# Patient Record
Sex: FEMALE | Race: WHITE | NOT HISPANIC OR LATINO | Employment: PART TIME | URBAN - METROPOLITAN AREA
[De-identification: names, ages, dates, MRNs, and addresses within clinical notes are randomized per-mention and may not be internally consistent; named-entity substitution may affect disease eponyms.]

---

## 2017-03-29 ENCOUNTER — OFFICE VISIT (OUTPATIENT)
Dept: URGENT CARE | Facility: CLINIC | Age: 37
End: 2017-03-29
Payer: COMMERCIAL

## 2017-03-29 VITALS
BODY MASS INDEX: 24.8 KG/M2 | OXYGEN SATURATION: 96 % | TEMPERATURE: 99.1 F | HEIGHT: 63 IN | DIASTOLIC BLOOD PRESSURE: 72 MMHG | SYSTOLIC BLOOD PRESSURE: 104 MMHG | WEIGHT: 140 LBS | HEART RATE: 85 BPM

## 2017-03-29 DIAGNOSIS — J01.90 ACUTE BACTERIAL SINUSITIS: ICD-10-CM

## 2017-03-29 DIAGNOSIS — J02.9 SORE THROAT: ICD-10-CM

## 2017-03-29 DIAGNOSIS — B96.89 ACUTE BACTERIAL SINUSITIS: ICD-10-CM

## 2017-03-29 LAB
INT CON NEG: NEGATIVE
INT CON POS: POSITIVE
S PYO AG THROAT QL: NORMAL

## 2017-03-29 PROCEDURE — 87880 STREP A ASSAY W/OPTIC: CPT | Performed by: NURSE PRACTITIONER

## 2017-03-29 PROCEDURE — 99214 OFFICE O/P EST MOD 30 MIN: CPT | Performed by: NURSE PRACTITIONER

## 2017-03-29 ASSESSMENT — ENCOUNTER SYMPTOMS
NECK PAIN: 1
FEVER: 0
SORE THROAT: 1
CHILLS: 0
VOMITING: 0
COUGH: 1
HEADACHES: 1
DIZZINESS: 0
NAUSEA: 0
MYALGIAS: 1

## 2017-03-29 NOTE — MR AVS SNAPSHOT
"        Ozzy GUIDO Addison   3/29/2017 6:00 PM   Office Visit   MRN: [id number]    Department:  [facility]   Dept Phone:  [phone]    Description:  Female : 1980   Provider:  BOB Bailey           Reason for Visit     Sinus Problem X 8 days, Sinus pressure, headache, ST, Dry cough, SOB, wheezing       Allergies as of 3/29/2017     Allergen Noted Reactions    Ceclor [Cefaclor] 2011       Ceftin 2011       Penicillins 2013         You were diagnosed with     Sore throat   [468331]       Acute bacterial sinusitis   [421546]         Vital Signs     Blood Pressure Pulse Temperature Height Weight Body Mass Index    104/72 mmHg 85 37.3 °C (99.1 °F) 1.6 m (5' 2.99\") 63.504 kg (140 lb) 24.81 kg/m2    Oxygen Saturation                   96%           Basic Information     Date Of Birth Sex Race Ethnicity Preferred Language    1980 Female White Non- English      Health Maintenance        Date Due Completion Dates    IMM DTaP/Tdap/Td Vaccine (1 - Tdap) 3/25/2001 ---    PAP SMEAR 3/25/2003 ---    IMM INFLUENZA (1) 2016 ---            Current Immunizations     No immunizations on file.      Below and/or attached are the medications your provider expects you to take. Review all of your home medications and newly ordered medications with your provider and/or pharmacist. Follow medication instructions as directed by your provider and/or pharmacist. Please keep your medication list with you and share with your provider. Update the information when medications are discontinued, doses are changed, or new medications (including over-the-counter products) are added; and carry medication information at all times in the event of emergency situations     Allergies:  CECLOR - (reactions not documented)     CEFTIN - (reactions not documented)     PENICILLINS - (reactions not documented)               Medications  Valid as of: 2017 -  6:21 PM    Generic Name Brand Name " Tablet Size Instructions for use    Azithromycin (Tab) ZITHROMAX 250 MG 2 tabs by mouth day 1, 1 tab by mouth days 2-5        Fluconazole (Tab) DIFLUCAN 100 MG Take 1 Tab by mouth every day.        Fluconazole (Tab) DIFLUCAN 150 MG Take 1 Tab by mouth every day.        Fluconazole (Tab) DIFLUCAN 150 MG Take 1 Tab by mouth every day.        FLUoxetine HCl   Take  by mouth.        Non Formulary Request Non Formulary Request  Pt. Is taking birth control        Non Formulary Request Non Formulary Request  Pt. Is also taking flextin for depresion         Norgestim-Eth Estrad Triphasic   Take  by mouth.        Phenazopyridine HCl (Tab) PYRIDIUM 200 MG Take 1 Tab by mouth 3 times a day as needed for Mild Pain.        Phenazopyridine HCl (Tab) PYRIDIUM 200 MG Take 1 Tab by mouth 3 times a day as needed for Mild Pain.        Polymyxin B-Trimethoprim (Solution) POLYTRIM 46109-8.1 UNIT/ML-% Place 1-2 Drops in both eyes every 6 hours.        .                 Medicines prescribed today were sent to:     [id number] - [place], [place] - [address]    [address] [facility] [address]    Phone: [phone] Fax: [phone]    Open 24 Hours?: No      Medication refill instructions:       If your prescription bottle indicates you have medication refills left, it is not necessary to call your provider’s office. Please contact your pharmacy and they will refill your medication.    If your prescription bottle indicates you do not have any refills left, you may request refills at any time through one of the following ways: The online Chic by Choice system (except Urgent Care), by calling your provider’s office, or by asking your pharmacy to contact your provider’s office with a refill request. Medication refills are processed only during regular business hours and may not be available until the next business day. Your provider may request additional information or to have a follow-up visit with you prior to refilling your medication.      *Please Note: Medication refills are assigned a new Rx number when refilled electronically. Your pharmacy may indicate that no refills were authorized even though a new prescription for the same medication is available at the pharmacy. Please request the medicine by name with the pharmacy before contacting your provider for a refill.           Precyse Access Code: [email or web address]  Expires: 4/28/2017  6:21 PM    Precyse  A secure, online tool to manage your health information     [employer]’s Precyse® is a secure, online tool that connects you to your personalized health information from the privacy of your home -- day or night - making it very easy for you to manage your healthcare. Once the activation process is completed, you can even access your medical information using the Precyse james, which is available for free in the Apple James store or Google Play store.     Precyse provides the following levels of access (as shown below):   My Chart Features   [facility] Primary Care Doctor [facility]  Specialists [facility]  Urgent  Care Non-[facility]  Primary Care  Doctor   Email your healthcare team securely and privately 24/7 X X X    Manage appointments: schedule your next appointment; view details of past/upcoming appointments X      Request prescription refills. X      View recent personal medical records, including lab and immunizations X X X X   View health record, including health history, allergies, medications X X X X   Read reports about your outpatient visits, procedures, consult and ER notes X X X X   See your discharge summary, which is a recap of your hospital and/or ER visit that includes your diagnosis, lab results, and care plan. X X       How to register for Precyse:  1. Go to  [email or web address].  2. Click on the Sign Up Now box, which takes you to the New Member Sign Up page. You will need to provide the following information:  a. Enter your Precyse Access Code exactly as it appears at the top of this  page. (You will not need to use this code after you’ve completed the sign-up process. If you do not sign up before the expiration date, you must request a new code.)   b. Enter your date of birth.   c. Enter your home email address.   d. Click Submit, and follow the next screen’s instructions.  3. Create a Radiation Watch ID. This will be your Radiation Watch login ID and cannot be changed, so think of one that is secure and easy to remember.  4. Create a Radiation Watch password. You can change your password at any time.  5. Enter your Password Reset Question and Answer. This can be used at a later time if you forget your password.   6. Enter your e-mail address. This allows you to receive e-mail notifications when new information is available in Radiation Watch.  7. Click Sign Up. You can now view your health information.    For assistance activating your Radiation Watch account, call [phone]

## 2017-03-30 RX ORDER — AMOXICILLIN AND CLAVULANATE POTASSIUM 875; 125 MG/1; MG/1
1 TABLET, FILM COATED ORAL 2 TIMES DAILY
Qty: 20 TAB | Refills: 0 | Status: SHIPPED | OUTPATIENT
Start: 2017-03-30 | End: 2017-04-06

## 2017-03-30 RX ORDER — FLUCONAZOLE 150 MG/1
150 TABLET ORAL DAILY
Qty: 1 TAB | Refills: 1 | Status: SHIPPED | OUTPATIENT
Start: 2017-03-30 | End: 2018-03-07

## 2017-03-30 NOTE — PROGRESS NOTES
"Subjective:      Ozzy Jaime is a 37 y.o. female who presents with Sinus Problem            Sinus Problem  This is a new problem. The current episode started 1 to 4 weeks ago. The problem is unchanged. There has been no fever. Associated symptoms include congestion, coughing, headaches, neck pain and a sore throat. Pertinent negatives include no chills. (Ear fullness  ) Treatments tried: allegra D.   Allergies, medications and history reviewed by me today      Review of Systems   Constitutional: Negative for fever and chills.   HENT: Positive for congestion and sore throat.    Respiratory: Positive for cough.    Gastrointestinal: Negative for nausea and vomiting.   Musculoskeletal: Positive for myalgias and neck pain.   Skin: Negative for rash.   Neurological: Positive for headaches. Negative for dizziness.          Objective:     /72 mmHg  Pulse 85  Temp(Src) 37.3 °C (99.1 °F)  Ht 1.6 m (5' 2.99\")  Wt 63.504 kg (140 lb)  BMI 24.81 kg/m2  SpO2 96%     Physical Exam   Constitutional: She is oriented to person, place, and time. She appears well-developed and well-nourished. No distress.   HENT:   Head: Normocephalic and atraumatic.   Right Ear: External ear and ear canal normal. Tympanic membrane is not injected and not perforated. No middle ear effusion.   Left Ear: External ear and ear canal normal. Tympanic membrane is not injected and not perforated.  No middle ear effusion.   Nose: Mucosal edema present.   Mouth/Throat: Posterior oropharyngeal erythema present. No oropharyngeal exudate.   Eyes: Conjunctivae are normal. Right eye exhibits no discharge. Left eye exhibits no discharge.   Neck: Normal range of motion. Neck supple.   Cardiovascular: Normal rate, regular rhythm and normal heart sounds.    No murmur heard.  Pulmonary/Chest: Effort normal and breath sounds normal. No respiratory distress.   Musculoskeletal: Normal range of motion.   Normal movement of all 4 extremities.   Lymphadenopathy: "     She has no cervical adenopathy.        Right: No supraclavicular adenopathy present.        Left: No supraclavicular adenopathy present.   Neurological: She is alert and oriented to person, place, and time. Gait normal.   Skin: Skin is warm and dry.   Psychiatric: She has a normal mood and affect. Her behavior is normal. Thought content normal.   Nursing note and vitals reviewed.              Assessment/Plan:     1. Sore throat  POCT Rapid Strep A   2. Acute bacterial sinusitis       Strep negative.   8 days with worsening symptoms over last 2 days. She has some allergies as well.  She has tolerated augmentin in past per her report.  Differential diagnosis, natural history, supportive care, and indications for immediate follow-up discussed at length.

## 2017-04-17 ENCOUNTER — OFFICE VISIT (OUTPATIENT)
Dept: URGENT CARE | Facility: CLINIC | Age: 37
End: 2017-04-17
Payer: COMMERCIAL

## 2017-04-17 VITALS
WEIGHT: 145.4 LBS | HEIGHT: 63 IN | TEMPERATURE: 97.9 F | HEART RATE: 78 BPM | OXYGEN SATURATION: 97 % | DIASTOLIC BLOOD PRESSURE: 76 MMHG | SYSTOLIC BLOOD PRESSURE: 116 MMHG | RESPIRATION RATE: 18 BRPM | BODY MASS INDEX: 25.76 KG/M2

## 2017-04-17 DIAGNOSIS — R06.2 WHEEZE: ICD-10-CM

## 2017-04-17 DIAGNOSIS — J98.8 RTI (RESPIRATORY TRACT INFECTION): ICD-10-CM

## 2017-04-17 PROCEDURE — 99214 OFFICE O/P EST MOD 30 MIN: CPT | Performed by: PHYSICIAN ASSISTANT

## 2017-04-17 RX ORDER — DOXYCYCLINE HYCLATE 100 MG
100 TABLET ORAL 2 TIMES DAILY
Qty: 20 TAB | Refills: 0 | Status: SHIPPED | OUTPATIENT
Start: 2017-04-17 | End: 2018-03-07

## 2017-04-17 RX ORDER — ALBUTEROL SULFATE 90 UG/1
2 AEROSOL, METERED RESPIRATORY (INHALATION) EVERY 6 HOURS PRN
Qty: 8.5 G | Refills: 1 | Status: SHIPPED | OUTPATIENT
Start: 2017-04-17 | End: 2018-03-07

## 2017-04-17 RX ORDER — PROMETHAZINE HYDROCHLORIDE AND CODEINE PHOSPHATE 6.25; 1 MG/5ML; MG/5ML
5 SYRUP ORAL 4 TIMES DAILY PRN
Qty: 120 ML | Refills: 0 | Status: SHIPPED | OUTPATIENT
Start: 2017-04-17 | End: 2018-03-07

## 2017-04-17 ASSESSMENT — ENCOUNTER SYMPTOMS
NAUSEA: 0
FEVER: 0
DIZZINESS: 0
SHORTNESS OF BREATH: 1
ABDOMINAL PAIN: 0
CHILLS: 0
WHEEZING: 1
SORE THROAT: 1
SPUTUM PRODUCTION: 1
HEADACHES: 1
DIARRHEA: 0
RHINORRHEA: 1
COUGH: 1
PALPITATIONS: 0
VOMITING: 0
MYALGIAS: 0

## 2017-04-17 ASSESSMENT — COPD QUESTIONNAIRES: COPD: 0

## 2017-04-17 NOTE — PROGRESS NOTES
Subjective:      Ozzy Do is a 37 y.o. female who presents with Cough            Cough  This is a new problem. The current episode started 1 to 4 weeks ago. The problem has been gradually worsening. The problem occurs every few minutes. The cough is productive of sputum. Associated symptoms include ear congestion, headaches, nasal congestion, rhinorrhea, a sore throat, shortness of breath and wheezing. Pertinent negatives include no chest pain, chills, ear pain, fever or myalgias. She has tried OTC cough suppressant for the symptoms. The treatment provided mild relief. Her past medical history is significant for asthma. There is no history of bronchitis, COPD or pneumonia.   Was seen 2.5 weeks ago and diagnosed with sinus infection. She was put on 10 days of Augmentin. She reports significant improvement however when she finished the antibiotic her symptoms returned and worse. Now she is having severe cough and some wheezing.      PMH:  has no past medical history on file.  MEDS:   Current outpatient prescriptions:   •  fluconazole (DIFLUCAN) 150 MG tablet, Take 1 Tab by mouth every day., Disp: 1 Tab, Rfl: 1  •  fluconazole (DIFLUCAN) 150 MG tablet, Take 1 Tab by mouth every day., Disp: 1 Tab, Rfl: 1  •  polymixin-trimethoprim (POLYTRIM) 27747-3.1 UNIT/ML-% SOLN, Place 1-2 Drops in both eyes every 6 hours., Disp: 1 Bottle, Rfl: 0  •  FLUoxetine HCl (PROZAC PO), Take  by mouth., Disp: , Rfl:   •  azithromycin (ZITHROMAX) 250 MG TABS, 2 tabs by mouth day 1, 1 tab by mouth days 2-5, Disp: 6 Tab, Rfl: 0  •  fluconazole (DIFLUCAN) 150 MG tablet, Take 1 Tab by mouth every day., Disp: 1 Tab, Rfl: 0  •  Norgestim-Eth Estrad Triphasic (ORTHO TRI-CYCLEN LO PO), Take  by mouth., Disp: , Rfl:   •  phenazopyridine (PYRIDIUM) 200 MG TABS, Take 1 Tab by mouth 3 times a day as needed for Mild Pain., Disp: 20 Each, Rfl: 0  •  Non Formulary Request, Pt. Is taking birth control, Disp: , Rfl:   •  Non Formulary Request, Pt. Is  "also taking flextin for depresion , Disp: , Rfl:   •  phenazopyridine (PYRIDIUM) 200 MG TABS, Take 1 Tab by mouth 3 times a day as needed for Mild Pain., Disp: 12 Each, Rfl: 0  •  fluconazole (DIFLUCAN) 100 MG TABS, Take 1 Tab by mouth every day., Disp: 1 Each, Rfl: 0  ALLERGIES:   Allergies   Allergen Reactions   • Ceclor [Cefaclor]    • Ceftin    • Penicillins      SURGHX: No past surgical history on file.  SOCHX:  reports that she has never smoked. She does not have any smokeless tobacco history on file. She reports that she drinks alcohol. She reports that she does not use illicit drugs.  FH: family history is not on file.      Review of Systems   Constitutional: Positive for malaise/fatigue. Negative for fever and chills.   HENT: Positive for congestion, rhinorrhea and sore throat. Negative for ear pain.    Respiratory: Positive for cough, sputum production, shortness of breath and wheezing.    Cardiovascular: Negative for chest pain, palpitations and leg swelling.   Gastrointestinal: Negative for nausea, vomiting, abdominal pain and diarrhea.   Musculoskeletal: Negative for myalgias and joint pain.   Neurological: Positive for headaches. Negative for dizziness.       Medications, Allergies, and current problem list reviewed today in Epic     Objective:     /76 mmHg  Pulse 78  Temp(Src) 36.6 °C (97.9 °F)  Resp 18  Ht 1.6 m (5' 3\")  Wt 65.953 kg (145 lb 6.4 oz)  BMI 25.76 kg/m2  SpO2 97%     Physical Exam   Constitutional: She is oriented to person, place, and time. She appears well-developed and well-nourished. No distress.   HENT:   Head: Normocephalic and atraumatic.   Right Ear: Hearing, tympanic membrane, external ear and ear canal normal. Tympanic membrane is not erythematous. No decreased hearing is noted.   Left Ear: Hearing, tympanic membrane, external ear and ear canal normal. Tympanic membrane is not erythematous. No decreased hearing is noted.   Nose: Right sinus exhibits maxillary sinus " tenderness. Left sinus exhibits no maxillary sinus tenderness.   Mouth/Throat: Normal dentition. Posterior oropharyngeal erythema present. No oropharyngeal exudate.   Eyes: Conjunctivae and EOM are normal. Pupils are equal, round, and reactive to light. Right eye exhibits no discharge. Left eye exhibits no discharge. No scleral icterus.   Neck: Normal range of motion. Neck supple.   Cardiovascular: Normal rate, regular rhythm and normal heart sounds.    No murmur heard.  Pulmonary/Chest: Effort normal. No respiratory distress. She has no decreased breath sounds. She has wheezes (scattered). She has no rhonchi.   Lymphadenopathy:        Head (right side): Submandibular adenopathy present.        Head (left side): Submandibular adenopathy present.     She has no cervical adenopathy.        Right cervical: No superficial cervical adenopathy present.       Left cervical: No superficial cervical adenopathy present.   Neurological: She is alert and oriented to person, place, and time.   Skin: Skin is warm, dry and intact. She is not diaphoretic. No cyanosis. Nails show no clubbing.   Psychiatric: She has a normal mood and affect. Her behavior is normal. Judgment and thought content normal.   Nursing note and vitals reviewed.              Assessment/Plan:      1. RTI (respiratory tract infection)  doxycycline (VIBRAMYCIN) 100 MG Tab    albuterol 108 (90 BASE) MCG/ACT Aero Soln inhalation aerosol    promethazine-codeine (PHENERGAN-CODEINE) 6.25-10 MG/5ML Syrup   2. Wheeze  albuterol 108 (90 BASE) MCG/ACT Aero Soln inhalation aerosol    promethazine-codeine (PHENERGAN-CODEINE) 6.25-10 MG/5ML Syrup     PO2 97%. Mild wheezing otherwise no signs of pneumonia.  Take full course of antibiotic  Cough meds at night  [facility] Aware web site evaluation: I have obtained and reviewed patient utilization report from [facility] pharmacy database prior to writing prescription for controlled substance II, III or IV. Based on the report  and my clinical assessment the prescription is medically necessary.   Patient is cautioned on sedation potential of narcotic medication; no drinking, driving or operating heavy machinery while on this medication.  Albuterol for wheezing  OTC meds and conservative measures as discussed  If no improvement on this round of antibiotics patient will need a referral to ENT.  Return to clinic or go to ED if symptoms worsen or persist. Indications for ED discussed at length. Patient voices understanding. Follow-up with your primary care provider in 3-5 days. Red flags discussed.    Please note that this dictation was created using voice recognition software. I have made every reasonable attempt to correct obvious errors, but I expect that there are errors of grammar and possibly content that I did not discover before finalizing the note.

## 2017-04-17 NOTE — MR AVS SNAPSHOT
"        Ozzy Do   2017 11:30 AM   Office Visit   MRN: [id number]    Department:  [facility]   Dept Phone:  [phone]    Description:  Female : 1980   Provider:  Rao Flaherty PA-C           Reason for Visit     Cough x was seen , finished 10 day course of amoxicillin, getting worse, loss of voice, cough, nasal congestion and drainage,       Allergies as of 2017     Allergen Noted Reactions    Ceclor [Cefaclor] 2011       Ceftin 2011       Penicillins 2013         You were diagnosed with     RTI (respiratory tract infection)   [301440]       Wheeze   [[id number]]         Vital Signs     Blood Pressure Pulse Temperature Respirations Height Weight    116/76 mmHg 78 36.6 °C (97.9 °F) 18 1.6 m (5' 3\") 65.953 kg (145 lb 6.4 oz)    Body Mass Index Oxygen Saturation                25.76 kg/m2 97%          Basic Information     Date Of Birth Sex Race Ethnicity Preferred Language    1980 Female White Non- English      Health Maintenance        Date Due Completion Dates    IMM DTaP/Tdap/Td Vaccine (1 - Tdap) 3/25/2001 ---    PAP SMEAR 3/25/2003 ---            Current Immunizations     No immunizations on file.      Below and/or attached are the medications your provider expects you to take. Review all of your home medications and newly ordered medications with your provider and/or pharmacist. Follow medication instructions as directed by your provider and/or pharmacist. Please keep your medication list with you and share with your provider. Update the information when medications are discontinued, doses are changed, or new medications (including over-the-counter products) are added; and carry medication information at all times in the event of emergency situations     Allergies:  CECLOR - (reactions not documented)     CEFTIN - (reactions not documented)     PENICILLINS - (reactions not documented)               Medications  Valid as of: 2017 - " 11:46 AM    Generic Name Brand Name Tablet Size Instructions for use    Albuterol Sulfate (Aero Soln) albuterol 108 (90 BASE) MCG/ACT Inhale 2 Puffs by mouth every 6 hours as needed for Shortness of Breath.        Azithromycin (Tab) ZITHROMAX 250 MG 2 tabs by mouth day 1, 1 tab by mouth days 2-5        Doxycycline Hyclate (Tab) VIBRAMYCIN 100 MG Take 1 Tab by mouth 2 times a day.        Fluconazole (Tab) DIFLUCAN 100 MG Take 1 Tab by mouth every day.        Fluconazole (Tab) DIFLUCAN 150 MG Take 1 Tab by mouth every day.        Fluconazole (Tab) DIFLUCAN 150 MG Take 1 Tab by mouth every day.        Fluconazole (Tab) DIFLUCAN 150 MG Take 1 Tab by mouth every day.        FLUoxetine HCl   Take  by mouth.        Non Formulary Request Non Formulary Request  Pt. Is taking birth control        Non Formulary Request Non Formulary Request  Pt. Is also taking flextin for depresion         Norgestim-Eth Estrad Triphasic   Take  by mouth.        Phenazopyridine HCl (Tab) PYRIDIUM 200 MG Take 1 Tab by mouth 3 times a day as needed for Mild Pain.        Phenazopyridine HCl (Tab) PYRIDIUM 200 MG Take 1 Tab by mouth 3 times a day as needed for Mild Pain.        Polymyxin B-Trimethoprim (Solution) POLYTRIM 94193-1.1 UNIT/ML-% Place 1-2 Drops in both eyes every 6 hours.        Promethazine-Codeine (Syrup) PHENERGAN-CODEINE 6.25-10 MG/5ML Take 5 mL by mouth 4 times a day as needed for Cough.        .                 Medicines prescribed today were sent to:     [facility] - [place] - [address]    [address] [place] [address]    Phone: [phone] Fax: [phone]    Open 24 Hours?: No      Medication refill instructions:       If your prescription bottle indicates you have medication refills left, it is not necessary to call your provider’s office. Please contact your pharmacy and they will refill your medication.    If your prescription bottle indicates you do not have any refills left, you may request refills at  any time through one of the following ways: The online Punchd system (except Urgent Care), by calling your provider’s office, or by asking your pharmacy to contact your provider’s office with a refill request. Medication refills are processed only during regular business hours and may not be available until the next business day. Your provider may request additional information or to have a follow-up visit with you prior to refilling your medication.   *Please Note: Medication refills are assigned a new Rx number when refilled electronically. Your pharmacy may indicate that no refills were authorized even though a new prescription for the same medication is available at the pharmacy. Please request the medicine by name with the pharmacy before contacting your provider for a refill.           Punchd Access Code: [email or web address]  Expires: 4/28/2017  6:21 PM    Punchd  A secure, online tool to manage your health information     [email or web address]’s Punchd® is a secure, online tool that connects you to your personalized health information from the privacy of your home -- day or night - making it very easy for you to manage your healthcare. Once the activation process is completed, you can even access your medical information using the Punchd james, which is available for free in the Apple James store or Google Play store.     Punchd provides the following levels of access (as shown below):   My Chart Features   [facility] Primary Care Doctor [facility]  Specialists [facility]  Urgent  Care Non-[facility]  Primary Care  Doctor   Email your healthcare team securely and privately 24/7 X X X    Manage appointments: schedule your next appointment; view details of past/upcoming appointments X      Request prescription refills. X      View recent personal medical records, including lab and immunizations X X X X   View health record, including health history, allergies, medications X X X X   Read reports about your outpatient visits,  procedures, consult and ER notes X X X X   See your discharge summary, which is a recap of your hospital and/or ER visit that includes your diagnosis, lab results, and care plan. X X       How to register for [employer]  1. Go to  [email or web address].  2. Click on the Sign Up Now box, which takes you to the New Member Sign Up page. You will need to provide the following information:  a. Enter your [employer] Access Code exactly as it appears at the top of this page. (You will not need to use this code after you’ve completed the sign-up process. If you do not sign up before the expiration date, you must request a new code.)   b. Enter your date of birth.   c. Enter your home email address.   d. Click Submit, and follow the next screen’s instructions.  3. Create a Glustert ID. This will be your Glustert login ID and cannot be changed, so think of one that is secure and easy to remember.  4. Create a Glustert password. You can change your password at any time.  5. Enter your Password Reset Question and Answer. This can be used at a later time if you forget your password.   6. Enter your e-mail address. This allows you to receive e-mail notifications when new information is available in [employer]  7. Click Sign Up. You can now view your health information.    For assistance activating your [employer] account, call [phone]

## 2017-09-27 ENCOUNTER — HOSPITAL ENCOUNTER (OUTPATIENT)
Dept: RADIOLOGY | Facility: MEDICAL CENTER | Age: 37
End: 2017-09-27
Attending: NURSE PRACTITIONER
Payer: COMMERCIAL

## 2017-09-27 DIAGNOSIS — Z84.2: ICD-10-CM

## 2017-09-27 DIAGNOSIS — Z12.31 SCREENING MAMMOGRAM, ENCOUNTER FOR: ICD-10-CM

## 2017-09-27 PROCEDURE — G0202 SCR MAMMO BI INCL CAD: HCPCS

## 2018-03-07 ENCOUNTER — OFFICE VISIT (OUTPATIENT)
Dept: URGENT CARE | Facility: CLINIC | Age: 38
End: 2018-03-07
Payer: COMMERCIAL

## 2018-03-07 VITALS
WEIGHT: 148.15 LBS | OXYGEN SATURATION: 97 % | HEIGHT: 63 IN | TEMPERATURE: 97.8 F | RESPIRATION RATE: 16 BRPM | HEART RATE: 76 BPM | SYSTOLIC BLOOD PRESSURE: 122 MMHG | BODY MASS INDEX: 26.25 KG/M2 | DIASTOLIC BLOOD PRESSURE: 78 MMHG

## 2018-03-07 DIAGNOSIS — H65.194 OTHER RECURRENT ACUTE NONSUPPURATIVE OTITIS MEDIA OF RIGHT EAR: ICD-10-CM

## 2018-03-07 PROCEDURE — 99213 OFFICE O/P EST LOW 20 MIN: CPT | Performed by: NURSE PRACTITIONER

## 2018-03-07 RX ORDER — AMOXICILLIN 875 MG/1
875 TABLET, COATED ORAL 2 TIMES DAILY
Qty: 20 TAB | Refills: 0 | Status: SHIPPED | OUTPATIENT
Start: 2018-03-07 | End: 2018-03-17

## 2018-03-07 RX ORDER — NORETHINDRONE ACETATE/ETHINYL ESTRADIOL 1MG-20MCG
1 KIT ORAL DAILY
COMMUNITY
Start: 2018-02-15

## 2018-03-07 RX ORDER — FLUOXETINE HYDROCHLORIDE 40 MG/1
40 CAPSULE ORAL DAILY
COMMUNITY
Start: 2018-02-26

## 2018-03-07 RX ORDER — FLUCONAZOLE 150 MG/1
150 TABLET ORAL ONCE
Qty: 1 TAB | Refills: 0 | Status: SHIPPED | OUTPATIENT
Start: 2018-03-07 | End: 2018-03-07

## 2018-03-07 ASSESSMENT — ENCOUNTER SYMPTOMS
RESPIRATORY NEGATIVE: 1
CONSTITUTIONAL NEGATIVE: 1
SORE THROAT: 1
NEUROLOGICAL NEGATIVE: 1
MUSCULOSKELETAL NEGATIVE: 1

## 2018-03-07 NOTE — PROGRESS NOTES
"Subjective:      Ozzy Do is a 37 y.o. female who presents with Otalgia (x1day right ear, consistant pain); Pharyngitis; and Neck Pain  Surgical HX reviewed in epic and not pertinent to today's visit  No past medical history on file.  Current medications reviewed.  Social History   Substance Use Topics   • Smoking status: Never Smoker   • Smokeless tobacco: Never Used   • Alcohol use Yes     HPI  Chief Complaint   Patient presents with   • Otalgia     x1day right ear, consistant pain   • Pharyngitis   • Neck Pain   Started about 24 hours ago with right ear pain and ST. No cough. No fever. Aching and throbbing in nature. Pain 6/10. She tried an allergy med without relief. No hx. No other aggravating or alleviating factors.     Review of Systems   Constitutional: Negative.    HENT: Positive for ear pain and sore throat.    Respiratory: Negative.    Musculoskeletal: Negative.    Skin: Negative.    Neurological: Negative.    All other systems reviewed and are negative.         Objective:     /78   Pulse 76   Temp 36.6 °C (97.8 °F)   Resp 16   Ht 1.6 m (5' 3\")   Wt 67.2 kg (148 lb 2.4 oz)   SpO2 97%   BMI 26.24 kg/m²      Physical Exam   Constitutional: She is oriented to person, place, and time. She appears well-developed and well-nourished. No distress.   HENT:   Head: Normocephalic.   Right Ear: Ear canal normal. Tympanic membrane is erythematous (mild).   Left Ear: Tympanic membrane and ear canal normal. Tympanic membrane is not erythematous.   Nose: Nose normal.   Mouth/Throat: Uvula is midline, oropharynx is clear and moist and mucous membranes are normal. No tonsillar exudate.   No TMJ tenderness    Neck: Normal range of motion.   Pulmonary/Chest: Effort normal. No respiratory distress.   Musculoskeletal: Normal range of motion.   Lymphadenopathy:     She has cervical adenopathy.        Right cervical: Superficial cervical adenopathy present.        Left cervical: No superficial cervical " adenopathy present.   Neurological: She is alert and oriented to person, place, and time.   Skin: Skin is warm and dry.   Psychiatric: She has a normal mood and affect.   Nursing note and vitals reviewed.              Assessment/Plan:     1. Other recurrent acute nonsuppurative otitis media of right ear  amoxicillin (AMOXIL) 875 MG tablet    fluconazole (DIFLUCAN) 150 MG tablet     Discussed that she does have a slight effusion and erythema, however I would tx conservatively with motrin, warm compresses and flonase. If sx persist or get worse over the next 48 hours, then start ABX. She verbalized understanding    Please make an appointment for follow up with your primary care provider. Return for any change in condition, worsening of symptoms, or any other concerns. Please go to the nearest emergency room for any shortness of breath or chest pain or for any of the emergent conditions we have discussed. Patient states understanding to plan of care and discharge instructions.    Please note that this dictation was created using voice recognition software. I have worked with consultants from the vendor as well as technical experts from [facility] to optimize the interface. I have made every reasonable attempt to correct obvious errors, but I expect that there are errors of grammar and possibly content that I did not discover before finalizing the note.

## 2018-11-14 ENCOUNTER — OFFICE VISIT (OUTPATIENT)
Dept: URGENT CARE | Facility: CLINIC | Age: 38
End: 2018-11-14
Payer: COMMERCIAL

## 2018-11-14 VITALS
TEMPERATURE: 98.5 F | WEIGHT: 149 LBS | HEIGHT: 63 IN | OXYGEN SATURATION: 95 % | BODY MASS INDEX: 26.4 KG/M2 | RESPIRATION RATE: 14 BRPM | SYSTOLIC BLOOD PRESSURE: 132 MMHG | DIASTOLIC BLOOD PRESSURE: 70 MMHG | HEART RATE: 84 BPM

## 2018-11-14 DIAGNOSIS — J32.9 OTHER SINUSITIS, UNSPECIFIED CHRONICITY: ICD-10-CM

## 2018-11-14 DIAGNOSIS — J00 NASOPHARYNGITIS: ICD-10-CM

## 2018-11-14 PROCEDURE — 99214 OFFICE O/P EST MOD 30 MIN: CPT | Performed by: PHYSICIAN ASSISTANT

## 2018-11-14 RX ORDER — AMOXICILLIN AND CLAVULANATE POTASSIUM 875; 125 MG/1; MG/1
1 TABLET, FILM COATED ORAL 2 TIMES DAILY
Qty: 20 TAB | Refills: 1 | Status: SHIPPED | OUTPATIENT
Start: 2018-11-14 | End: 2018-11-24

## 2018-11-14 RX ORDER — FLUCONAZOLE 150 MG/1
150 TABLET ORAL
Qty: 2 TAB | Refills: 1 | Status: SHIPPED | OUTPATIENT
Start: 2018-11-14 | End: 2019-10-23

## 2018-11-14 ASSESSMENT — ENCOUNTER SYMPTOMS
SORE THROAT: 1
CONSTITUTIONAL NEGATIVE: 1
CARDIOVASCULAR NEGATIVE: 1
RESPIRATORY NEGATIVE: 1
HEADACHES: 1
COUGH: 0
SINUS PRESSURE: 1
SINUS PAIN: 1
EYES NEGATIVE: 1
SWOLLEN GLANDS: 1

## 2018-11-14 NOTE — PROGRESS NOTES
"Subjective:      Ozzy Do is a 38 y.o. female who presents with Sinus Problem (x3mxrgw, sinus congestion and pain, runny nose, sore throat, mucous, ear pressure, glands are painful)            Sinus Problem   This is a new problem. The current episode started more than 1 month ago (sinus mani, st). The problem is unchanged. There has been no fever. The pain is moderate. Associated symptoms include congestion, headaches, sinus pressure, a sore throat and swollen glands. Pertinent negatives include no coughing. Past treatments include nothing. The treatment provided no relief.       Review of Systems   Constitutional: Negative.    HENT: Positive for congestion, sinus pain, sinus pressure and sore throat.    Eyes: Negative.    Respiratory: Negative.  Negative for cough.    Cardiovascular: Negative.    Skin: Negative.    Neurological: Positive for headaches.   All other systems reviewed and are negative.         Objective:     /70 (BP Location: Left arm, Patient Position: Sitting, BP Cuff Size: Adult)   Pulse 84   Temp 36.9 °C (98.5 °F) (Temporal)   Resp 14   Ht 1.6 m (5' 3\")   Wt 67.6 kg (149 lb)   SpO2 95%   BMI 26.39 kg/m²      Physical Exam   Constitutional: She is oriented to person, place, and time. She appears well-developed and well-nourished. No distress.   HENT:   Head: Normocephalic and atraumatic.   Mouth/Throat: No oropharyngeal exudate.   +maxill.sinus press.  +phar./tons redn       Eyes: Pupils are equal, round, and reactive to light. Conjunctivae and EOM are normal.   Neck: Normal range of motion. Neck supple.   Cardiovascular: Normal rate.    Pulmonary/Chest: Effort normal and breath sounds normal. No respiratory distress.   Lymphadenopathy:     She has cervical adenopathy.   Neurological: She is alert and oriented to person, place, and time.   Skin: Skin is warm and dry. No rash noted.   Psychiatric: She has a normal mood and affect. Her behavior is normal.   Nursing note and vitals " reviewed.    Active Ambulatory Problems     Diagnosis Date Noted   • No Active Ambulatory Problems     Resolved Ambulatory Problems     Diagnosis Date Noted   • No Resolved Ambulatory Problems     No Additional Past Medical History     Current Outpatient Prescriptions on File Prior to Visit   Medication Sig Dispense Refill   • fluoxetine (PROZAC) 40 MG capsule      • MAXWELL 1/20 1-20 MG-MCG per tablet        No current facility-administered medications on file prior to visit.      Social History     Social History   • Marital status: Single     Spouse name: N/A   • Number of children: N/A   • Years of education: N/A     Occupational History   • Not on file.     Social History Main Topics   • Smoking status: Never Smoker   • Smokeless tobacco: Never Used   • Alcohol use Yes   • Drug use: No   • Sexual activity: Yes     Partners: Male     Birth control/ protection: Pill     Other Topics Concern   • Not on file     Social History Narrative   • No narrative on file     Family History   Problem Relation Age of Onset   • Cancer Paternal Grandmother      Ceclor [cefaclor]              Assessment/Plan:     ·  NASOPHARYNGITIS      · Start w/MucinexD; nsaids; [hold rx for abx prn [conditional use] if sx persist/worsen]  ·

## 2018-11-20 ENCOUNTER — APPOINTMENT (OUTPATIENT)
Dept: RADIOLOGY | Facility: IMAGING CENTER | Age: 38
End: 2018-11-20
Attending: PHYSICIAN ASSISTANT
Payer: COMMERCIAL

## 2018-11-20 ENCOUNTER — OFFICE VISIT (OUTPATIENT)
Dept: URGENT CARE | Facility: CLINIC | Age: 38
End: 2018-11-20
Payer: COMMERCIAL

## 2018-11-20 VITALS
SYSTOLIC BLOOD PRESSURE: 120 MMHG | OXYGEN SATURATION: 94 % | HEART RATE: 77 BPM | BODY MASS INDEX: 26.4 KG/M2 | RESPIRATION RATE: 14 BRPM | WEIGHT: 149 LBS | DIASTOLIC BLOOD PRESSURE: 80 MMHG | HEIGHT: 63 IN | TEMPERATURE: 98.3 F

## 2018-11-20 DIAGNOSIS — J98.01 BRONCHOSPASM: ICD-10-CM

## 2018-11-20 DIAGNOSIS — R05.9 COUGH: ICD-10-CM

## 2018-11-20 DIAGNOSIS — J06.9 UPPER RESPIRATORY TRACT INFECTION, UNSPECIFIED TYPE: ICD-10-CM

## 2018-11-20 PROCEDURE — 99214 OFFICE O/P EST MOD 30 MIN: CPT | Performed by: PHYSICIAN ASSISTANT

## 2018-11-20 PROCEDURE — 71046 X-RAY EXAM CHEST 2 VIEWS: CPT | Mod: 26 | Performed by: PHYSICIAN ASSISTANT

## 2018-11-20 RX ORDER — METHYLPREDNISOLONE 4 MG/1
TABLET ORAL
Qty: 1 KIT | Refills: 0 | Status: SHIPPED | OUTPATIENT
Start: 2018-11-20 | End: 2019-10-23

## 2018-11-20 ASSESSMENT — ENCOUNTER SYMPTOMS
ABDOMINAL PAIN: 0
DIARRHEA: 0
CHILLS: 0
SINUS PAIN: 0
SPUTUM PRODUCTION: 0
EYE DISCHARGE: 0
SHORTNESS OF BREATH: 0
NAUSEA: 0
SORE THROAT: 0
FEVER: 0
VOMITING: 0
WHEEZING: 0
COUGH: 0
EYE REDNESS: 0

## 2018-11-21 NOTE — PROGRESS NOTES
"Subjective:   Ozzy Do is a 38 y.o. female who presents for Follow-Up (not feeling better having SOB for 2 days)        Other   This is a new problem. The current episode started in the past 7 days. Associated symptoms include congestion. Pertinent negatives include no abdominal pain, chills, coughing, fever, nausea, rash, sore throat or vomiting.     Notes onset of mild sob or dyspnea, hard to take full breath, notes has been feeling sick for around 5wks, has contaqcted PCP but has appt w/ PCP in one month, denies fever/chills, denies ST/ear pain, ST has resolved now, c/o fatigue and feeling short of breath, ear pain resolved ,sinus pressure resolved, denies emesis, c/o nausea, denies abdpain or diarrhea/rash, PMH of asthma no use of MDI for years, denies PMH of bronchitis/pneumonia, does have seasonal allerg, taking claritin. C/o lack of full deep breath and fatigue.  Patient reports sinus symptoms improving but feels dry spastic cough that is persistent.    Review of Systems   Constitutional: Negative for chills and fever.   HENT: Positive for congestion. Negative for ear pain, sinus pain ( resolved) and sore throat.    Eyes: Negative for discharge and redness.   Respiratory: Negative for cough, sputum production, shortness of breath and wheezing ( denies but very dry).    Gastrointestinal: Negative for abdominal pain, diarrhea, nausea and vomiting.   Skin: Negative for rash.   Endo/Heme/Allergies: Positive for environmental allergies.     Allergies   Allergen Reactions   • Ceclor [Cefaclor]    I have worn a mask for the entire encounter with this patient.    Objective:   /80 (BP Location: Left arm, Patient Position: Sitting, BP Cuff Size: Adult)   Pulse 77   Temp 36.8 °C (98.3 °F)   Resp 14   Ht 1.6 m (5' 3\")   Wt 67.6 kg (149 lb)   SpO2 94%   BMI 26.39 kg/m²   Physical Exam   Constitutional: She is oriented to person, place, and time. She appears well-developed and well-nourished. No distress. "   HENT:   Head: Normocephalic and atraumatic.   Right Ear: Tympanic membrane, external ear and ear canal normal.   Left Ear: Tympanic membrane, external ear and ear canal normal.   Nose: Nose normal.   Mouth/Throat: Uvula is midline and mucous membranes are normal. Posterior oropharyngeal erythema ( moderate PND ) present. No oropharyngeal exudate, posterior oropharyngeal edema or tonsillar abscesses.   Eyes: Conjunctivae and lids are normal. Right eye exhibits no discharge. Left eye exhibits no discharge. No scleral icterus.   Neck: Neck supple.   Pulmonary/Chest: Effort normal. No respiratory distress. She has no decreased breath sounds. She has no wheezes. She has no rhonchi. She has no rales.   Musculoskeletal: Normal range of motion.   Lymphadenopathy:     She has cervical adenopathy ( mild bilat).   Neurological: She is alert and oriented to person, place, and time. She is not disoriented.   Skin: Skin is warm and dry. She is not diaphoretic. No erythema. No pallor.   Psychiatric: Her speech is normal and behavior is normal.   Nursing note and vitals reviewed.  CXR - Impression       1.  Unremarkable two view chest.   Reading Provider Reading Date   Nelly Hayes M.D. Nov 20, 2018   Signing Provider Signing Date Signing Time   Nelly Hayes M.D. Nov 20, 2018  5:17 PM         Assessment/Plan:   1. Upper respiratory tract infection, unspecified type    2. Cough  - DX-CHEST-2 VIEWS; Future    3. Bronchospasm  - MethylPREDNISolone (MEDROL DOSEPAK) 4 MG Tablet Therapy Pack; Take as directed on package.  Dispense one package.  Dispense: 1 Kit; Refill: 0  Supportive care is reviewed with patient/caregiver - recommend to push PO fluids and electrolytes, Nsaids/tylenol, netti pot/saline irrig, humidifier in home, flonase, ponaris, antihistamine, Cautioned regarding potential side effects of steroid, avoid nsaids while using  Finish Augmentin, push probiotics, trend resolution of symptoms, continue over-the-counter  allergy treatments, follow-up with primary care with persistent fatigue.    Return to clinic with lack of resolution or progression of symptoms.    Differential diagnosis, natural history, supportive care, and indications for immediate follow-up discussed.

## 2019-02-14 ENCOUNTER — OFFICE VISIT (OUTPATIENT)
Dept: URGENT CARE | Facility: CLINIC | Age: 39
End: 2019-02-14
Payer: COMMERCIAL

## 2019-02-14 VITALS
RESPIRATION RATE: 16 BRPM | OXYGEN SATURATION: 96 % | BODY MASS INDEX: 26.75 KG/M2 | TEMPERATURE: 99 F | WEIGHT: 151 LBS | HEART RATE: 73 BPM | SYSTOLIC BLOOD PRESSURE: 122 MMHG | HEIGHT: 63 IN | DIASTOLIC BLOOD PRESSURE: 84 MMHG

## 2019-02-14 DIAGNOSIS — H66.005 RECURRENT ACUTE SUPPURATIVE OTITIS MEDIA WITHOUT SPONTANEOUS RUPTURE OF LEFT TYMPANIC MEMBRANE: ICD-10-CM

## 2019-02-14 PROCEDURE — 99214 OFFICE O/P EST MOD 30 MIN: CPT | Performed by: FAMILY MEDICINE

## 2019-02-14 RX ORDER — AMOXICILLIN 875 MG/1
875 TABLET, COATED ORAL 2 TIMES DAILY
Qty: 14 TAB | Refills: 0 | Status: SHIPPED | OUTPATIENT
Start: 2019-02-14 | End: 2019-02-21

## 2019-02-14 ASSESSMENT — ENCOUNTER SYMPTOMS
SENSORY CHANGE: 0
EYE REDNESS: 0
FOCAL WEAKNESS: 0
WEIGHT LOSS: 0
EYE DISCHARGE: 0
MYALGIAS: 0
HEADACHES: 1

## 2019-02-14 NOTE — PROGRESS NOTES
"Subjective:      Ozzy Do is a 38 y.o. female who presents with Otalgia (lots of ear pressure and headaches x 2 days )            Onset last night left earache. No drainage. No fever. Pain was severe this morning. Some relief with advil. No recent swimming. Associated nasal congestion. Remote PMH otitis media as adult. +ST. No cough. Muffled hearing on left. No trauma/barotrauma. No other aggravating or alleviating factors.          Review of Systems   Constitutional: Positive for malaise/fatigue. Negative for weight loss.   Eyes: Negative for discharge and redness.   Musculoskeletal: Negative for joint pain and myalgias.   Skin: Negative for itching and rash.   Neurological: Positive for headaches. Negative for sensory change and focal weakness.     .  Medications, Allergies, and current problem list reviewed today in Epic       Objective:     /84 (BP Location: Left arm, Patient Position: Sitting, BP Cuff Size: Large adult)   Pulse 73   Temp 37.2 °C (99 °F) (Temporal)   Resp 16   Ht 1.6 m (5' 3\")   Wt 68.5 kg (151 lb)   SpO2 96%   BMI 26.75 kg/m²      Physical Exam   Constitutional: She is oriented to person, place, and time. She appears well-developed and well-nourished. No distress.   HENT:   Head: Normocephalic and atraumatic.   Right Ear: External ear normal.   Left TM red, dull bulging   Eyes: Conjunctivae are normal.   Neck: Neck supple.   Cardiovascular: Normal rate, regular rhythm and normal heart sounds.    Pulmonary/Chest: Effort normal and breath sounds normal. She has no wheezes.   Lymphadenopathy:     She has no cervical adenopathy.   Neurological: She is alert and oriented to person, place, and time. She exhibits normal muscle tone.   Skin: Skin is warm and dry. No rash noted.               Assessment/Plan:     1. Recurrent acute suppurative otitis media without spontaneous rupture of left tympanic membrane  amoxicillin (AMOXIL) 875 MG tablet     Differential diagnosis, natural " history, supportive care, and indications for immediate follow-up discussed at length.     Decongestant

## 2019-10-23 ENCOUNTER — OFFICE VISIT (OUTPATIENT)
Dept: CARDIOLOGY | Facility: MEDICAL CENTER | Age: 39
End: 2019-10-23
Payer: COMMERCIAL

## 2019-10-23 VITALS
HEIGHT: 63 IN | OXYGEN SATURATION: 98 % | HEART RATE: 88 BPM | WEIGHT: 150 LBS | DIASTOLIC BLOOD PRESSURE: 64 MMHG | BODY MASS INDEX: 26.58 KG/M2 | SYSTOLIC BLOOD PRESSURE: 116 MMHG

## 2019-10-23 DIAGNOSIS — Z82.49 FAMILY HISTORY OF HYPERTROPHIC CARDIOMYOPATHY: ICD-10-CM

## 2019-10-23 DIAGNOSIS — R00.2 PALPITATIONS: ICD-10-CM

## 2019-10-23 PROCEDURE — 99203 OFFICE O/P NEW LOW 30 MIN: CPT | Performed by: INTERNAL MEDICINE

## 2019-10-23 ASSESSMENT — ENCOUNTER SYMPTOMS
HEARTBURN: 0
BLURRED VISION: 0
NERVOUS/ANXIOUS: 0
PALPITATIONS: 1
NAUSEA: 0
COUGH: 0
PND: 0
BRUISES/BLEEDS EASILY: 0
CHILLS: 0
FEVER: 0
DEPRESSION: 0
SHORTNESS OF BREATH: 0
DIZZINESS: 0
MYALGIAS: 0
EYE DISCHARGE: 0
HEADACHES: 0

## 2019-10-23 NOTE — LETTER
[facility]   [address], [address] [facility] [address]  [place] [address]  Phone: [phone]  Fax: [phone]              Ozzy Do  1980    Encounter Date: 10/23/2019    Tres Stauffer M.D.          PROGRESS NOTE:  Chief Complaint   Patient presents with   • Palpitations       Subjective:   Ozzy Do is a 39 y.o. female who presents today in consultation at the request of Dr. Hansen for family history of hypertrophic cardiomyopathy and episodes of palpitations.    This otherwise healthy 39-year-old lady has become anxious because her 59-year-old father who has had a left bundle branch block for many years is now being treated for complications of hypertrophic cardiomyopathy.    Genetic studies are to be done.    The patient herself has had an echocardiogram at [facility] reportedly normal.  This report is not available for review.  Recent EKG also was normal.    She reports a lifelong history of a few seconds of palpitations which she feels in her upper left chest wall.  Episodes are short and have never adversely concerned her.  However, a few days ago she was teaching a Pilates class and in 20 seconds felt 3 episodes of very brief palpitations in her upper abdomen.  Because of the family history and the unusual location of her palpitations, she became very concerned about her personal health.    Past history medically is 1 of good health.  She recalls having chest pains in her 20s that lasted 30 minutes and this was never evaluated and it disappeared many many years ago.    Social history: She is a professional hairdresser, and a Pilates teacher.  She is .  No tobacco.  No excessive alcohol.    No past medical history on file.  No past surgical history on file.  Family History   Problem Relation Age of Onset   • Cancer Paternal Grandmother      Social History     Socioeconomic History   • Marital status: Single     Spouse name: Not on  file   • Number of children: Not on file   • Years of education: Not on file   • Highest education level: Not on file   Occupational History   • Not on file   Social Needs   • Financial resource strain: Not on file   • Food insecurity:     Worry: Not on file     Inability: Not on file   • Transportation needs:     Medical: Not on file     Non-medical: Not on file   Tobacco Use   • Smoking status: Never Smoker   • Smokeless tobacco: Never Used   Substance and Sexual Activity   • Alcohol use: Yes   • Drug use: No   • Sexual activity: Yes     Partners: Male     Birth control/protection: Pill   Lifestyle   • Physical activity:     Days per week: Not on file     Minutes per session: Not on file   • Stress: Not on file   Relationships   • Social connections:     Talks on phone: Not on file     Gets together: Not on file     Attends Hoahaoism service: Not on file     Active member of club or organization: Not on file     Attends meetings of clubs or organizations: Not on file     Relationship status: Not on file   • Intimate partner violence:     Fear of current or ex partner: Not on file     Emotionally abused: Not on file     Physically abused: Not on file     Forced sexual activity: Not on file   Other Topics Concern   • Not on file   Social History Narrative   • Not on file     Allergies   Allergen Reactions   • Ceclor [Cefaclor]      Outpatient Encounter Medications as of 10/23/2019   Medication Sig Dispense Refill   • PREBIOTIC PRODUCT PO Take  by mouth every day.     • CALCIUM-MAGNESIUM-ZINC PO Take  by mouth.     • Cholecalciferol (VITAMIN D3 PO) Take  by mouth.     • Omega-3 Fatty Acids (OMEGA 3 PO) Take  by mouth.     • B Complex Vitamins (VITAMIN-B COMPLEX PO) Take  by mouth every 7 days.     • Probiotic Product (PROBIOTIC DAILY PO) Take  by mouth every day.     • fluoxetine (PROZAC) 40 MG capsule Take 40 mg by mouth every day.     • MAXWELL 1/20 1-20 MG-MCG per tablet Take 1 Tab by mouth every day.     •  "[DISCONTINUED] MethylPREDNISolone (MEDROL DOSEPAK) 4 MG Tablet Therapy Pack Take as directed on package.  Dispense one package. (Patient not taking: Reported on 2/14/2019) 1 Kit 0   • [DISCONTINUED] Fish Oil-Cholecalciferol (OMEGA-3 + D PO) Take  by mouth.     • [DISCONTINUED] Calcium-Magnesium-Vitamin D (CALCIUM 500 PO) Take  by mouth.     • [DISCONTINUED] fluconazole (DIFLUCAN) 150 MG tablet Take 1 Tab by mouth 1 time daily as needed. (Patient not taking: Reported on 10/23/2019) 2 Tab 1     No facility-administered encounter medications on file as of 10/23/2019.      Review of Systems   Constitutional: Negative for chills, fever and malaise/fatigue.   Eyes: Negative for blurred vision and discharge.   Respiratory: Negative for cough and shortness of breath.    Cardiovascular: Positive for palpitations. Negative for chest pain, leg swelling and PND.   Gastrointestinal: Negative for heartburn and nausea.   Genitourinary: Negative for dysuria and urgency.   Musculoskeletal: Negative for myalgias.   Skin: Negative for itching and rash.   Neurological: Negative for dizziness and headaches.   Endo/Heme/Allergies: Negative for environmental allergies. Does not bruise/bleed easily.   Psychiatric/Behavioral: Negative for depression. The patient is not nervous/anxious.         Objective:   /64 (BP Location: Left arm, Patient Position: Sitting, BP Cuff Size: Adult)   Pulse 88   Ht 1.6 m (5' 3\")   Wt 68 kg (150 lb)   SpO2 98%   BMI 26.57 kg/m²      Physical Exam   Constitutional: She is oriented to person, place, and time. She appears well-nourished.   Neck: No JVD present.   Cardiovascular: Normal rate and regular rhythm. Exam reveals no gallop and no friction rub.   No murmur heard.  Pulmonary/Chest: Effort normal and breath sounds normal.   Abdominal: Soft. There is no tenderness.   Musculoskeletal: She exhibits edema.   Neurological: She is alert and oriented to person, place, and time.   Skin: Skin is warm and " dry.       Assessment:     1. Palpitations     2. Family history of hypertrophic cardiomyopathy         Medical Decision Making:  Today's Assessment / Status / Plan:   The patient will get genetic studies through [facility].  However, her complaints of palpitations are benign in character and her heart exam EKG and echo are normal.  Strong reassurance.  Thank you for this consultation      Jackie Hansen M.D.  [address] [facility] [address]  VIA Facsimile: [phone]

## 2019-10-23 NOTE — PROGRESS NOTES
Chief Complaint   Patient presents with   • Palpitations       Subjective:   Ozzy Do is a 39 y.o. female who presents today in consultation at the request of Dr. Hansen for family history of hypertrophic cardiomyopathy and episodes of palpitations.    This otherwise healthy 39-year-old lady has become anxious because her 59-year-old father who has had a left bundle branch block for many years is now being treated for complications of hypertrophic cardiomyopathy.    Genetic studies are to be done.    The patient herself has had an echocardiogram at [facility] reportedly normal.  This report is not available for review.  Recent EKG also was normal.    She reports a lifelong history of a few seconds of palpitations which she feels in her upper left chest wall.  Episodes are short and have never adversely concerned her.  However, a few days ago she was teaching a Pilates class and in 20 seconds felt 3 episodes of very brief palpitations in her upper abdomen.  Because of the family history and the unusual location of her palpitations, she became very concerned about her personal health.    Past history medically is 1 of good health.  She recalls having chest pains in her 20s that lasted 30 minutes and this was never evaluated and it disappeared many many years ago.    Social history: She is a professional hairdresser, and a Pilates teacher.  She is .  No tobacco.  No excessive alcohol.    No past medical history on file.  No past surgical history on file.  Family History   Problem Relation Age of Onset   • Cancer Paternal Grandmother      Social History     Socioeconomic History   • Marital status: Single     Spouse name: Not on file   • Number of children: Not on file   • Years of education: Not on file   • Highest education level: Not on file   Occupational History   • Not on file   Social Needs   • Financial resource strain: Not on file   • Food insecurity:     Worry: Not on file      Inability: Not on file   • Transportation needs:     Medical: Not on file     Non-medical: Not on file   Tobacco Use   • Smoking status: Never Smoker   • Smokeless tobacco: Never Used   Substance and Sexual Activity   • Alcohol use: Yes   • Drug use: No   • Sexual activity: Yes     Partners: Male     Birth control/protection: Pill   Lifestyle   • Physical activity:     Days per week: Not on file     Minutes per session: Not on file   • Stress: Not on file   Relationships   • Social connections:     Talks on phone: Not on file     Gets together: Not on file     Attends [employer] service: Not on file     Active member of club or organization: Not on file     Attends meetings of clubs or organizations: Not on file     Relationship status: Not on file   • Intimate partner violence:     Fear of current or ex partner: Not on file     Emotionally abused: Not on file     Physically abused: Not on file     Forced sexual activity: Not on file   Other Topics Concern   • Not on file   Social History Narrative   • Not on file     Allergies   Allergen Reactions   • Ceclor [Cefaclor]      Outpatient Encounter Medications as of 10/23/2019   Medication Sig Dispense Refill   • PREBIOTIC PRODUCT PO Take  by mouth every day.     • CALCIUM-MAGNESIUM-ZINC PO Take  by mouth.     • Cholecalciferol (VITAMIN D3 PO) Take  by mouth.     • Omega-3 Fatty Acids (OMEGA 3 PO) Take  by mouth.     • B Complex Vitamins (VITAMIN-B COMPLEX PO) Take  by mouth every 7 days.     • Probiotic Product (PROBIOTIC DAILY PO) Take  by mouth every day.     • fluoxetine (PROZAC) 40 MG capsule Take 40 mg by mouth every day.     • MAXWELL 1/20 1-20 MG-MCG per tablet Take 1 Tab by mouth every day.     • [DISCONTINUED] MethylPREDNISolone (MEDROL DOSEPAK) 4 MG Tablet Therapy Pack Take as directed on package.  Dispense one package. (Patient not taking: Reported on 2/14/2019) 1 Kit 0   • [DISCONTINUED] Fish Oil-Cholecalciferol (OMEGA-3 + D PO) Take  by mouth.     •  "[DISCONTINUED] Calcium-Magnesium-Vitamin D (CALCIUM 500 PO) Take  by mouth.     • [DISCONTINUED] fluconazole (DIFLUCAN) 150 MG tablet Take 1 Tab by mouth 1 time daily as needed. (Patient not taking: Reported on 10/23/2019) 2 Tab 1     No facility-administered encounter medications on file as of 10/23/2019.      Review of Systems   Constitutional: Negative for chills, fever and malaise/fatigue.   Eyes: Negative for blurred vision and discharge.   Respiratory: Negative for cough and shortness of breath.    Cardiovascular: Positive for palpitations. Negative for chest pain, leg swelling and PND.   Gastrointestinal: Negative for heartburn and nausea.   Genitourinary: Negative for dysuria and urgency.   Musculoskeletal: Negative for myalgias.   Skin: Negative for itching and rash.   Neurological: Negative for dizziness and headaches.   Endo/Heme/Allergies: Negative for environmental allergies. Does not bruise/bleed easily.   Psychiatric/Behavioral: Negative for depression. The patient is not nervous/anxious.         Objective:   /64 (BP Location: Left arm, Patient Position: Sitting, BP Cuff Size: Adult)   Pulse 88   Ht 1.6 m (5' 3\")   Wt 68 kg (150 lb)   SpO2 98%   BMI 26.57 kg/m²     Physical Exam   Constitutional: She is oriented to person, place, and time. She appears well-nourished.   Neck: No JVD present.   Cardiovascular: Normal rate and regular rhythm. Exam reveals no gallop and no friction rub.   No murmur heard.  Pulmonary/Chest: Effort normal and breath sounds normal.   Abdominal: Soft. There is no tenderness.   Musculoskeletal: She exhibits edema.   Neurological: She is alert and oriented to person, place, and time.   Skin: Skin is warm and dry.       Assessment:     1. Palpitations     2. Family history of hypertrophic cardiomyopathy         Medical Decision Making:  Today's Assessment / Status / Plan:   The patient will get genetic studies through [facility].  However, her complaints of palpitations " are benign in character and her heart exam EKG and echo are normal.  Strong reassurance.  Thank you for this consultation

## 2019-10-24 ENCOUNTER — TELEPHONE (OUTPATIENT)
Dept: CARDIOLOGY | Facility: MEDICAL CENTER | Age: 39
End: 2019-10-24

## 2021-04-28 ENCOUNTER — APPOINTMENT (OUTPATIENT)
Dept: RADIOLOGY | Facility: MEDICAL CENTER | Age: 41
End: 2021-04-28
Attending: EMERGENCY MEDICINE
Payer: COMMERCIAL

## 2021-04-28 ENCOUNTER — APPOINTMENT (OUTPATIENT)
Dept: CARDIOLOGY | Facility: MEDICAL CENTER | Age: 41
End: 2021-04-28
Attending: EMERGENCY MEDICINE
Payer: COMMERCIAL

## 2021-04-28 ENCOUNTER — HOSPITAL ENCOUNTER (EMERGENCY)
Facility: MEDICAL CENTER | Age: 41
End: 2021-04-28
Attending: EMERGENCY MEDICINE
Payer: COMMERCIAL

## 2021-04-28 VITALS
TEMPERATURE: 99 F | OXYGEN SATURATION: 99 % | SYSTOLIC BLOOD PRESSURE: 110 MMHG | WEIGHT: 143.74 LBS | DIASTOLIC BLOOD PRESSURE: 81 MMHG | RESPIRATION RATE: 19 BRPM | HEIGHT: 62 IN | HEART RATE: 77 BPM | BODY MASS INDEX: 26.45 KG/M2

## 2021-04-28 DIAGNOSIS — R55 SYNCOPE, UNSPECIFIED SYNCOPE TYPE: ICD-10-CM

## 2021-04-28 DIAGNOSIS — R42 DIZZINESS: ICD-10-CM

## 2021-04-28 LAB
ALBUMIN SERPL BCP-MCNC: 4.7 G/DL (ref 3.2–4.9)
ALBUMIN/GLOB SERPL: 1.6 G/DL
ALP SERPL-CCNC: 64 U/L (ref 30–99)
ALT SERPL-CCNC: 18 U/L (ref 2–50)
ANION GAP SERPL CALC-SCNC: 11 MMOL/L (ref 7–16)
AST SERPL-CCNC: 15 U/L (ref 12–45)
BASOPHILS # BLD AUTO: 0.1 % (ref 0–1.8)
BASOPHILS # BLD: 0.02 K/UL (ref 0–0.12)
BILIRUB SERPL-MCNC: 0.2 MG/DL (ref 0.1–1.5)
BUN SERPL-MCNC: 15 MG/DL (ref 8–22)
CALCIUM SERPL-MCNC: 9.8 MG/DL (ref 8.5–10.5)
CHLORIDE SERPL-SCNC: 104 MMOL/L (ref 96–112)
CO2 SERPL-SCNC: 23 MMOL/L (ref 20–33)
CREAT SERPL-MCNC: 0.86 MG/DL (ref 0.5–1.4)
D DIMER PPP IA.FEU-MCNC: 0.42 UG/ML (FEU) (ref 0–0.5)
EKG IMPRESSION: NORMAL
EOSINOPHIL # BLD AUTO: 0 K/UL (ref 0–0.51)
EOSINOPHIL NFR BLD: 0 % (ref 0–6.9)
ERYTHROCYTE [DISTWIDTH] IN BLOOD BY AUTOMATED COUNT: 46.5 FL (ref 35.9–50)
GLOBULIN SER CALC-MCNC: 3 G/DL (ref 1.9–3.5)
GLUCOSE SERPL-MCNC: 133 MG/DL (ref 65–99)
HCG SERPL QL: NEGATIVE
HCT VFR BLD AUTO: 40 % (ref 37–47)
HGB BLD-MCNC: 13.6 G/DL (ref 12–16)
IMM GRANULOCYTES # BLD AUTO: 0.1 K/UL (ref 0–0.11)
IMM GRANULOCYTES NFR BLD AUTO: 0.7 % (ref 0–0.9)
LV EJECT FRACT  99904: 70
LV EJECT FRACT MOD 2C 99903: 71.54
LV EJECT FRACT MOD 4C 99902: 71.68
LV EJECT FRACT MOD BP 99901: 71.42
LYMPHOCYTES # BLD AUTO: 1.25 K/UL (ref 1–4.8)
LYMPHOCYTES NFR BLD: 8.7 % (ref 22–41)
MCH RBC QN AUTO: 33.4 PG (ref 27–33)
MCHC RBC AUTO-ENTMCNC: 34 G/DL (ref 33.6–35)
MCV RBC AUTO: 98.3 FL (ref 81.4–97.8)
MONOCYTES # BLD AUTO: 0.7 K/UL (ref 0–0.85)
MONOCYTES NFR BLD AUTO: 4.9 % (ref 0–13.4)
NEUTROPHILS # BLD AUTO: 12.24 K/UL (ref 2–7.15)
NEUTROPHILS NFR BLD: 85.6 % (ref 44–72)
NRBC # BLD AUTO: 0 K/UL
NRBC BLD-RTO: 0 /100 WBC
PLATELET # BLD AUTO: 324 K/UL (ref 164–446)
PMV BLD AUTO: 10.5 FL (ref 9–12.9)
POTASSIUM SERPL-SCNC: 4.4 MMOL/L (ref 3.6–5.5)
PROT SERPL-MCNC: 7.7 G/DL (ref 6–8.2)
RBC # BLD AUTO: 4.07 M/UL (ref 4.2–5.4)
SODIUM SERPL-SCNC: 138 MMOL/L (ref 135–145)
TROPONIN T SERPL-MCNC: 10 NG/L (ref 6–19)
TSH SERPL DL<=0.005 MIU/L-ACNC: 2.34 UIU/ML (ref 0.38–5.33)
WBC # BLD AUTO: 14.3 K/UL (ref 4.8–10.8)

## 2021-04-28 PROCEDURE — 71045 X-RAY EXAM CHEST 1 VIEW: CPT

## 2021-04-28 PROCEDURE — 93005 ELECTROCARDIOGRAM TRACING: CPT

## 2021-04-28 PROCEDURE — 93306 TTE W/DOPPLER COMPLETE: CPT | Mod: 26 | Performed by: INTERNAL MEDICINE

## 2021-04-28 PROCEDURE — 85025 COMPLETE CBC W/AUTO DIFF WBC: CPT

## 2021-04-28 PROCEDURE — 85379 FIBRIN DEGRADATION QUANT: CPT

## 2021-04-28 PROCEDURE — 84443 ASSAY THYROID STIM HORMONE: CPT

## 2021-04-28 PROCEDURE — 70450 CT HEAD/BRAIN W/O DYE: CPT

## 2021-04-28 PROCEDURE — 80053 COMPREHEN METABOLIC PANEL: CPT

## 2021-04-28 PROCEDURE — 93005 ELECTROCARDIOGRAM TRACING: CPT | Performed by: EMERGENCY MEDICINE

## 2021-04-28 PROCEDURE — 93306 TTE W/DOPPLER COMPLETE: CPT

## 2021-04-28 PROCEDURE — 84484 ASSAY OF TROPONIN QUANT: CPT

## 2021-04-28 PROCEDURE — 84703 CHORIONIC GONADOTROPIN ASSAY: CPT

## 2021-04-28 PROCEDURE — 99284 EMERGENCY DEPT VISIT MOD MDM: CPT

## 2021-04-28 NOTE — ED PROVIDER NOTES
ED Provider Note    CHIEF COMPLAINT  Chief Complaint   Patient presents with   • Dizziness   • Syncope     -LOC, pt reports she hit her head.        HPI  Ozzy Do is a 41 y.o. female who presents for evaluation of an episode of dizziness syncope with head injury.  The patient apparently has a strong family history of hypertrophic cardiomyopathy.  She has no known history of cardiopulmonary disease besides asthma.  She apparently had an episode in the shower where she felt lightheaded and dizzy and apparently fell and struck her head.  There was no loss of consciousness no reported injury to the upper or lower extremities chest abdomen or pelvis.  The patient does take meloxicam and recently had a cortisone injection in her left knee.  She denies pleuritic chest pain.  No focal numbness weakness tingling to the arms legs or face.  No report of cocaine or amphetamine use.    REVIEW OF SYSTEMS  See HPI for further details.  No high fevers chills night sweats weight loss all other systems are negative.     PAST MEDICAL HISTORY  Past Medical History:   Diagnosis Date   • Asthma        FAMILY HISTORY  Family history of hypertrophic cardiomyopathy    SOCIAL HISTORY  Social History     Socioeconomic History   • Marital status: Single     Spouse name: Not on file   • Number of children: Not on file   • Years of education: Not on file   • Highest education level: Not on file   Occupational History   • Not on file   Tobacco Use   • Smoking status: Never Smoker   • Smokeless tobacco: Never Used   Substance and Sexual Activity   • Alcohol use: Yes     Comment: occasional    • Drug use: No   • Sexual activity: Yes     Partners: Male     Birth control/protection: Pill   Other Topics Concern   • Not on file   Social History Narrative   • Not on file     Social Determinants of Health     Financial Resource Strain:    • Difficulty of Paying Living Expenses:    Food Insecurity:    • Worried About Running Out of Food in the  "Last Year:    • Ran Out of Food in the Last Year:    Transportation Needs:    • Lack of Transportation (Medical):    • Lack of Transportation (Non-Medical):    Physical Activity:    • Days of Exercise per Week:    • Minutes of Exercise per Session:    Stress:    • Feeling of Stress :    Social Connections:    • Frequency of Communication with Friends and Family:    • Frequency of Social Gatherings with Friends and Family:    • Attends [employer] Services:    • Active Member of Clubs or Organizations:    • Attends Club or Organization Meetings:    • Marital Status:    Intimate Partner Violence:    • Fear of Current or Ex-Partner:    • Emotionally Abused:    • Physically Abused:    • Sexually Abused:    Social alcohol    SURGICAL HISTORY  No past surgical history on file.    CURRENT MEDICATIONS  Home Medications     Reviewed by Nan Kuhn R.N. (Registered Nurse) on 04/28/21 at 1159  Med List Status: Partial   Medication Last Dose Status   B Complex Vitamins (VITAMIN-B COMPLEX PO)  Active   CALCIUM-MAGNESIUM-ZINC PO  Active   Cholecalciferol (VITAMIN D3 PO)  Active   fluoxetine (PROZAC) 40 MG capsule  Active   MAXWELL 1/20 1-20 MG-MCG per tablet  Active   Omega-3 Fatty Acids (OMEGA 3 PO)  Active   PREBIOTIC PRODUCT PO  Active   Probiotic Product (PROBIOTIC DAILY PO)  Active                ALLERGIES  Allergies   Allergen Reactions   • Ceclor [Cefaclor]        PHYSICAL EXAM  VITAL SIGNS: /79   Pulse 90   Temp 37.2 °C (98.9 °F) (Temporal)   Resp 16   Ht 1.575 m (5' 2\")   Wt 65.2 kg (143 lb 11.8 oz)   SpO2 98%   BMI 26.29 kg/m²       Constitutional: Well developed, Well nourished, No acute distress, Non-toxic appearance.   HENT: Normocephalic, Atraumatic, Bilateral external ears normal, Oropharynx moist, No oral exudates, Nose normal.   Eyes: PERRLA, EOMI, Conjunctiva normal, No discharge.   Neck: Normal range of motion, No tenderness, Supple, No stridor.    Cardiovascular: Normal heart rate, Normal " rhythm, No murmurs, No rubs, No gallops.   Thorax & Lungs: Normal breath sounds, No respiratory distress, No wheezing, No chest tenderness.   Abdomen: Bowel sounds normal, Soft, No tenderness, No masses, No pulsatile masses.   Skin: Warm, Dry, No erythema, No rash.   Extremities: Intact distal pulses, No edema, No tenderness, No cyanosis, No clubbing.   Neurologic: Alert & oriented x 3, Normal motor function, Normal sensory function, No focal deficits noted.   Psychiatric: Affect normal, Judgment normal, Mood normal.     Transthoracic  Echo Report        Echocardiography Laboratory     CONCLUSIONS  Normal echocardiogram study.      No prior study is available for comparison.      DAPHNIE DUGAN  Exam Date:         2021                      12:54  Exam Location:     Inpatient  Priority:          Routine     Ordering Physician:        AUGUSTA CHENG  Referring Physician:       [id number]  Sonographer:               Denisha Brush RDCS     Age:    41     Gender:    F  MRN:    [id number]  :    1980  BSA:    1.66   Ht (in):    62     Wt (lb):    143  Exam Type:     Complete     Indications:     Syncope  ICD Codes:       780.2     CPT Codes:       64498     BP:   117    /   79     HR:   72  Technical Quality:       Fair     MEASUREMENTS  (Male / Female) Normal Values  2D ECHO  LV Diastolic Diameter PLAX        3.8 cm                4.2 - 5.9 / 3.9 - 5.3   cm  LV Systolic Diameter PLAX         2.4 cm                2.1 - 4.0 cm  IVS Diastolic Thickness           0.66 cm                 LVPW Diastolic Thickness          0.69 cm                 LVOT Diameter                     1.8 cm                  Estimated LV Ejection Fraction    70 %                    LV Ejection Fraction MOD BP       71.4 %                >= 55  %  LV Ejection Fraction MOD 4C       71.7 %                  LV Ejection Fraction MOD 2C       71.5 %                     DOPPLER  AV Peak Velocity                  1.2 m/s                 AV  Peak Gradient                  6 mmHg                  AV Mean Gradient                  3.4 mmHg                LVOT Peak Velocity                1.2 m/s                 AV Area Cont Eq vti               2.3 cm2                 Mitral E Point Velocity           0.9 m/s                 Mitral E to A Ratio               1.8                     MV Pressure Half Time             71.3 ms                 MV Area PHT                       3.1 cm2                 MV Deceleration Time              246 ms                  PV Peak Velocity                  0.97 m/s                PV Peak Gradient                  3.7 mmHg                RVOT Peak Velocity                0.9 m/s                    * Indicates values subject to auto-interpretation  LV EF:  70    %     FINDINGS  Left Ventricle  Normal left ventricular size, thickness, systolic function, and   diastolic function. Left ventricular ejection fraction is visually   estimated to be 70%. Normal regional wall motion.     Right Ventricle  Normal right ventricular size and systolic function.     Right Atrium  Normal right atrial size.     Left Atrium  Normal left atrial size. Left atrial volume index is 18 mL/sq m.     Mitral Valve  Structurally normal mitral valve without significant stenosis or   regurgitation.     Aortic Valve  Structurally normal aortic valve without significant stenosis or   regurgitation.     Tricuspid Valve  Unable to estimate pulmonary artery pressure due to an inadequate   tricuspid regurgitant jet.     Pulmonic Valve  Structurally normal pulmonic valve without significant stenosis or   regurgitation.     Pericardium  Normal pericardium without effusion.     Aorta  Normal aortic root for body surface area.              RADIOLOGY/PROCEDURES  CT-HEAD W/O   Final Result      No acute intracranial abnormality.      EC-ECHOCARDIOGRAM COMPLETE W/O CONT   Final Result      DX-CHEST-PORTABLE (1 VIEW)   Final Result         1. No acute cardiopulmonary  abnormalities are identified.        Results for orders placed or performed during the hospital encounter of 04/28/21   EC-ECHOCARDIOGRAM COMPLETE W/O CONT   Result Value Ref Range    Eject.Frac. MOD BP 71.42     Eject.Frac. MOD 4C 71.68     Eject.Frac. MOD 2C 71.54     Left Ventrical Ejection Fraction 70    CBC WITH DIFFERENTIAL   Result Value Ref Range    WBC 14.3 (H) 4.8 - 10.8 K/uL    RBC 4.07 (L) 4.20 - 5.40 M/uL    Hemoglobin 13.6 12.0 - 16.0 g/dL    Hematocrit 40.0 37.0 - 47.0 %    MCV 98.3 (H) 81.4 - 97.8 fL    MCH 33.4 (H) 27.0 - 33.0 pg    MCHC 34.0 33.6 - 35.0 g/dL    RDW 46.5 35.9 - 50.0 fL    Platelet Count 324 164 - 446 K/uL    MPV 10.5 9.0 - 12.9 fL    Neutrophils-Polys 85.60 (H) 44.00 - 72.00 %    Lymphocytes 8.70 (L) 22.00 - 41.00 %    Monocytes 4.90 0.00 - 13.40 %    Eosinophils 0.00 0.00 - 6.90 %    Basophils 0.10 0.00 - 1.80 %    Immature Granulocytes 0.70 0.00 - 0.90 %    Nucleated RBC 0.00 /100 WBC    Neutrophils (Absolute) 12.24 (H) 2.00 - 7.15 K/uL    Lymphs (Absolute) 1.25 1.00 - 4.80 K/uL    Monos (Absolute) 0.70 0.00 - 0.85 K/uL    Eos (Absolute) 0.00 0.00 - 0.51 K/uL    Baso (Absolute) 0.02 0.00 - 0.12 K/uL    Immature Granulocytes (abs) 0.10 0.00 - 0.11 K/uL    NRBC (Absolute) 0.00 K/uL   D-DIMER   Result Value Ref Range    D-Dimer Screen 0.42 0.00 - 0.50 ug/mL (FEU)   HCG QUAL SERUM   Result Value Ref Range    Beta-Hcg Qualitative Serum Negative Negative   TSH   Result Value Ref Range    TSH 2.340 0.380 - 5.330 uIU/mL   Comp Metabolic Panel   Result Value Ref Range    Sodium 138 135 - 145 mmol/L    Potassium 4.4 3.6 - 5.5 mmol/L    Chloride 104 96 - 112 mmol/L    Co2 23 20 - 33 mmol/L    Anion Gap 11.0 7.0 - 16.0    Glucose 133 (H) 65 - 99 mg/dL    Bun 15 8 - 22 mg/dL    Creatinine 0.86 0.50 - 1.40 mg/dL    Calcium 9.8 8.5 - 10.5 mg/dL    AST(SGOT) 15 12 - 45 U/L    ALT(SGPT) 18 2 - 50 U/L    Alkaline Phosphatase 64 30 - 99 U/L    Total Bilirubin 0.2 0.1 - 1.5 mg/dL    Albumin 4.7 3.2 -  4.9 g/dL    Total Protein 7.7 6.0 - 8.2 g/dL    Globulin 3.0 1.9 - 3.5 g/dL    A-G Ratio 1.6 g/dL   TROPONIN   Result Value Ref Range    Troponin T 10 6 - 19 ng/L   ESTIMATED GFR   Result Value Ref Range    GFR If African American >60 >60 mL/min/1.73 m 2    GFR If Non African American >60 >60 mL/min/1.73 m 2   EKG   Result Value Ref Range    Report       [facility] Emergency Dept.    Test Date:  2021  Pt Name:    DAPHNIE DUGAN                  Department: ER  MRN:        [id number]                      Room:  Gender:     Female                       Technician: [id number]  :        1980                   Requested By:ER TRIAGE PROTOCOL  Order #:    [id number]                    Reading MD: AUGUSTA CHENG MD    Measurements  Intervals                                Axis  Rate:       82                           P:          80  MD:         136                          QRS:        50  QRSD:       74                           T:          38  QT:         380  QTc:        444    Interpretive Statements  SINUS RHYTHM  LEFT ATRIAL ABNORMALITY  RSR' IN V1 OR V2, PROBABLY NORMAL VARIANT  No previous ECG available for comparison  Electronically Signed On 2021 16:03:24 PDT by AUGUSTA CHENG MD          COURSE & MEDICAL DECISION MAKING  Pertinent Labs & Imaging studies reviewed. (See chart for details)  Presents here after a syncopal event with head injury.  Differential diagnosis was extensive.  Patient had extensive work-up.  CT scan of the head is negative for intracranial hemorrhage or mass.  D-dimer is negative and she is low risk pretest probability therefore further testing is not indicated.  Laboratory studies are notable for minimal leukocytosis.  Patient did recently receive a steroid injection which could account for that we see no other overt signs of infectious process.  Her blood sugar is also slightly elevated at 133 she is not a known diabetic.  This will need to be followed up on.   She was observed on telemetry for several hours and did not have any tacky arrhythmia.  Her echocardiogram does not demonstrate any valvular disease or any signs of hypertrophic cardiomyopathy.  I feel the patient is appropriate for discharge with urgent follow-up with cardiology    FINAL IMPRESSION  1.   1. Dizziness     2. Syncope, unspecified syncope type  REFERRAL TO CARDIOLOGY              Electronically signed by: Holger Diamond M.D., 4/28/2021 2:39 PM

## 2021-04-28 NOTE — ED TRIAGE NOTES
"Chief Complaint   Patient presents with   • Dizziness   • Syncope     -LOC, pt reports she hit her head.        Pt ambulatory to triage with steady gait for above complaint. Pt reports her dizziness subsided after syncopal episode. Denies any other neurologic symptoms.     Pt is alert and oriented, speaking in full sentences, follows commands and responds appropriately to questions. Resp are even and unlabored.     Pt placed in lobby. Pt educated on triage process and encouraged to alert staff for any changes.      /79   Pulse 90   Temp 37.2 °C (98.9 °F) (Temporal)   Resp 16   Ht 1.575 m (5' 2\")   Wt 65.2 kg (143 lb 11.8 oz)   SpO2 98%     "

## 2021-05-04 ENCOUNTER — OFFICE VISIT (OUTPATIENT)
Dept: CARDIOLOGY | Facility: MEDICAL CENTER | Age: 41
End: 2021-05-04
Attending: EMERGENCY MEDICINE
Payer: COMMERCIAL

## 2021-05-04 VITALS
HEART RATE: 74 BPM | OXYGEN SATURATION: 99 % | DIASTOLIC BLOOD PRESSURE: 80 MMHG | SYSTOLIC BLOOD PRESSURE: 116 MMHG | WEIGHT: 140 LBS | HEIGHT: 63 IN | BODY MASS INDEX: 24.8 KG/M2

## 2021-05-04 DIAGNOSIS — R55 VASOVAGAL EPISODE: ICD-10-CM

## 2021-05-04 PROCEDURE — 99203 OFFICE O/P NEW LOW 30 MIN: CPT | Performed by: INTERNAL MEDICINE

## 2021-05-04 RX ORDER — MELOXICAM 15 MG/1
15 TABLET ORAL
COMMUNITY
Start: 2021-02-05 | End: 2022-10-05

## 2021-05-04 RX ORDER — CETIRIZINE HYDROCHLORIDE 10 MG/1
10 TABLET ORAL
COMMUNITY

## 2021-05-04 ASSESSMENT — ENCOUNTER SYMPTOMS
DEPRESSION: 1
RESPIRATORY NEGATIVE: 1
DIZZINESS: 1
MUSCULOSKELETAL NEGATIVE: 1
CARDIOVASCULAR NEGATIVE: 1
CONSTITUTIONAL NEGATIVE: 1
GASTROINTESTINAL NEGATIVE: 1

## 2021-05-04 ASSESSMENT — FIBROSIS 4 INDEX: FIB4 SCORE: 0.43

## 2021-05-04 NOTE — PROGRESS NOTES
Chief Complaint   Patient presents with   • Syncope       Subjective:   Ozzy Do is a 41 y.o. female who was seen in consultation at the request of Dr. Diamond from the ER after she was evaluated for evaluation of presyncope.  The patient was in the shower and began to feel lightheaded.  She persisted on standing up and noted that she was nauseated.  She then got the shower and lie down.  She did not actually faint.  The patient was evaluated in the ER.  EKG from that visit was personally reviewed and is normal.  She also had an echo that was entirely normal.    The patient has a family history of hypertrophic cardiomyopathy.  Her father is been diagnosed with this and apparently had genetic testing performed at [place] and the gene has been identified.  The patient's echo from April 20, 2021 has been personally reviewed and I reviewed the images with the patient.  The echo is entirely normal.  The LV thickness is entirely normal.  There is absolutely no evidence of hypertrophic cardiomyopathy.    The patient has history of depression and has been on the same dose of Prozac for about 20 years.  She states her depression is under good control.  She exercises regularly and teaches Pilates.  There is no history of exercise intolerance, palpitations, or edema.    Past Medical History:   Diagnosis Date   • Asthma      History reviewed. No pertinent surgical history.  Family History   Problem Relation Age of Onset   • Cancer Paternal Grandmother    • Heart Disease Father    • Heart Failure Father      Social History     Socioeconomic History   • Marital status: Single     Spouse name: Not on file   • Number of children: Not on file   • Years of education: Not on file   • Highest education level: Not on file   Occupational History   • Not on file   Tobacco Use   • Smoking status: Never Smoker   • Smokeless tobacco: Never Used   Substance and Sexual Activity   • Alcohol use: Yes     Comment: occasional    • Drug  use: No   • Sexual activity: Yes     Partners: Male     Birth control/protection: Pill   Other Topics Concern   • Not on file   Social History Narrative   • Not on file     Social Determinants of Health     Financial Resource Strain:    • Difficulty of Paying Living Expenses:    Food Insecurity:    • Worried About Running Out of Food in the Last Year:    • Ran Out of Food in the Last Year:    Transportation Needs:    • Lack of Transportation (Medical):    • Lack of Transportation (Non-Medical):    Physical Activity:    • Days of Exercise per Week:    • Minutes of Exercise per Session:    Stress:    • Feeling of Stress :    Social Connections:    • Frequency of Communication with Friends and Family:    • Frequency of Social Gatherings with Friends and Family:    • Attends [employer] Services:    • Active Member of Clubs or Organizations:    • Attends Club or Organization Meetings:    • Marital Status:    Intimate Partner Violence:    • Fear of Current or Ex-Partner:    • Emotionally Abused:    • Physically Abused:    • Sexually Abused:      Allergies   Allergen Reactions   • Ceclor [Cefaclor]      Outpatient Encounter Medications as of 5/4/2021   Medication Sig Dispense Refill   • meloxicam (MOBIC) 15 MG tablet Take 15 mg by mouth 1 time a day as needed.     • cetirizine (ZYRTEC) 10 MG Tab Take 10 mg by mouth 1 time a day as needed for Allergies.     • PREBIOTIC PRODUCT PO Take  by mouth every day.     • CALCIUM-MAGNESIUM-ZINC PO Take  by mouth Q30 DAYS.     • Cholecalciferol (VITAMIN D3 PO) Take 5,000 Units by mouth every day.     • Omega-3 Fatty Acids (OMEGA 3 PO) Take  by mouth Q30 DAYS.     • B Complex Vitamins (VITAMIN-B COMPLEX PO) Take  by mouth every 7 days.     • fluoxetine (PROZAC) 40 MG capsule Take 40 mg by mouth every day.     • MAXWELL 1/20 1-20 MG-MCG per tablet Take 1 Tab by mouth every day.     • Probiotic Product (PROBIOTIC DAILY PO) Take  by mouth every day.       No facility-administered encounter  "medications on file as of 5/4/2021.     Review of Systems   Constitutional: Negative.    HENT: Negative.    Respiratory: Negative.    Cardiovascular: Negative.    Gastrointestinal: Negative.    Musculoskeletal: Negative.    Skin: Negative.    Neurological: Positive for dizziness.   Endo/Heme/Allergies: Negative.    Psychiatric/Behavioral: Positive for depression.   All other systems reviewed and are negative.       Objective:   /80 (BP Location: Left arm, Patient Position: Sitting, BP Cuff Size: Adult)   Pulse 74   Ht 1.6 m (5' 3\")   Wt 63.5 kg (140 lb)   SpO2 99%   BMI 24.80 kg/m²     Physical Exam   Constitutional: She is oriented to person, place, and time. No distress.   HENT:   Head: Normocephalic and atraumatic.   Eyes: Pupils are equal, round, and reactive to light. No scleral icterus.   Neck: No JVD present.   Cardiovascular: Normal rate and regular rhythm.   No murmur heard.  Pulmonary/Chest: No respiratory distress. She has no wheezes.   Abdominal: Soft. She exhibits no distension. There is no abdominal tenderness.   Musculoskeletal:         General: No edema.   Neurological: She is alert and oriented to person, place, and time.   Skin: Skin is warm.   Psychiatric: She has a normal mood and affect.       Assessment:     1. Vasovagal episode         Medical Decision Making:  Today's Assessment / Status / Plan:   Vasovagal episode: The patient is rather classic history of a vasovagal event.  She was in the shower taking a hot shower and then became dizzy followed by nausea and tunnel vision.  The symptoms abated when she laid down and she was evaluated in the ER.  Echo and EKG are entirely normal.  Tilt table test is not warranted at this at this time.  I explained the pathophysiology of vagal so vagal events the patient and instructed her to liberalize her fluid intake and salt in her diet.  If she has recurrent events, then would need to consider tilt table testing etc.    Family history of " hypertrophic cardiomyopathy: Echo is entirely normal and the patient was reassured that by age 41, with a normal echo, it is unlikely that she will ever develop hypertrophic obstructive cardiomyopathy.  Her father has had genetic testing and a gene was identified.  She will write down the name of his specific gene defect her father has and get back to us through my chart and we can order genetic testing for her.      She will return as needed.  I also discussed the fact that Prozac may exacerbate her vasovagal events.  She has not had this reassessed in 20years.  She states her depression is in control and have asked her to discuss this with her primary care provider and consider reducing or tapering her off her antidepressant medication.

## 2021-07-13 ENCOUNTER — PRE-ADMISSION TESTING (OUTPATIENT)
Dept: ADMISSIONS | Facility: MEDICAL CENTER | Age: 41
End: 2021-07-13
Attending: ORTHOPAEDIC SURGERY
Payer: COMMERCIAL

## 2021-07-13 RX ORDER — BACILLUS COAGULANS/INULIN 1B-250 MG
CAPSULE ORAL
COMMUNITY
Start: 2016-02-01

## 2021-07-13 ASSESSMENT — FIBROSIS 4 INDEX: FIB4 SCORE: 0.43

## 2021-07-13 NOTE — OR NURSING
"Preadmit appointment: \" Preparing for your Procedure information\" sheet given to patient with verbal and written instructions. Patient instructed to continue prescribed medications through the day before surgery, instructed to take the following medications the day of surgery per anesthesia protocol:  TYLENOL, ZYRTEC, MAXWELL          Verbal and written instructions on wearing a mask in public and with persons known not to have been vaccinated for Covid-19 prior to surgery and covid symptoms to watch for given to patient, pt advised to notify MD if any symptoms develop    No labs or ECG needed 06/13/21    Pt watched the [facility] pre-admit instruction video  "

## 2021-07-28 ENCOUNTER — HOSPITAL ENCOUNTER (OUTPATIENT)
Facility: MEDICAL CENTER | Age: 41
End: 2021-07-28
Attending: ORTHOPAEDIC SURGERY | Admitting: ORTHOPAEDIC SURGERY
Payer: COMMERCIAL

## 2021-07-28 ENCOUNTER — ANESTHESIA (OUTPATIENT)
Dept: SURGERY | Facility: MEDICAL CENTER | Age: 41
End: 2021-07-28
Payer: COMMERCIAL

## 2021-07-28 ENCOUNTER — ANESTHESIA EVENT (OUTPATIENT)
Dept: SURGERY | Facility: MEDICAL CENTER | Age: 41
End: 2021-07-28
Payer: COMMERCIAL

## 2021-07-28 VITALS
TEMPERATURE: 97.7 F | HEART RATE: 78 BPM | SYSTOLIC BLOOD PRESSURE: 140 MMHG | HEIGHT: 63 IN | RESPIRATION RATE: 20 BRPM | DIASTOLIC BLOOD PRESSURE: 78 MMHG | WEIGHT: 142.42 LBS | OXYGEN SATURATION: 97 % | BODY MASS INDEX: 25.23 KG/M2

## 2021-07-28 DIAGNOSIS — G89.18 POSTOPERATIVE PAIN: ICD-10-CM

## 2021-07-28 DIAGNOSIS — S83.232A COMPLEX TEAR OF MEDIAL MENISCUS OF LEFT KNEE AS CURRENT INJURY, INITIAL ENCOUNTER: ICD-10-CM

## 2021-07-28 LAB — HCG UR QL: NEGATIVE

## 2021-07-28 PROCEDURE — 160046 HCHG PACU - 1ST 60 MINS PHASE II: Performed by: ORTHOPAEDIC SURGERY

## 2021-07-28 PROCEDURE — 29875 ARTHRS KNEE SURG SYNVCT LMTD: CPT | Mod: LT | Performed by: ORTHOPAEDIC SURGERY

## 2021-07-28 PROCEDURE — 160036 HCHG PACU - EA ADDL 30 MINS PHASE I: Performed by: ORTHOPAEDIC SURGERY

## 2021-07-28 PROCEDURE — 160035 HCHG PACU - 1ST 60 MINS PHASE I: Performed by: ORTHOPAEDIC SURGERY

## 2021-07-28 PROCEDURE — 160048 HCHG OR STATISTICAL LEVEL 1-5: Performed by: ORTHOPAEDIC SURGERY

## 2021-07-28 PROCEDURE — 700111 HCHG RX REV CODE 636 W/ 250 OVERRIDE (IP): Performed by: ORTHOPAEDIC SURGERY

## 2021-07-28 PROCEDURE — 160029 HCHG SURGERY MINUTES - 1ST 30 MINS LEVEL 4: Performed by: ORTHOPAEDIC SURGERY

## 2021-07-28 PROCEDURE — 700111 HCHG RX REV CODE 636 W/ 250 OVERRIDE (IP): Performed by: ANESTHESIOLOGY

## 2021-07-28 PROCEDURE — 29875 ARTHRS KNEE SURG SYNVCT LMTD: CPT | Mod: ASROC,LT | Performed by: PHYSICIAN ASSISTANT

## 2021-07-28 PROCEDURE — 160025 RECOVERY II MINUTES (STATS): Performed by: ORTHOPAEDIC SURGERY

## 2021-07-28 PROCEDURE — 700102 HCHG RX REV CODE 250 W/ 637 OVERRIDE(OP): Performed by: ANESTHESIOLOGY

## 2021-07-28 PROCEDURE — 700101 HCHG RX REV CODE 250: Performed by: ORTHOPAEDIC SURGERY

## 2021-07-28 PROCEDURE — 160002 HCHG RECOVERY MINUTES (STAT): Performed by: ORTHOPAEDIC SURGERY

## 2021-07-28 PROCEDURE — 700105 HCHG RX REV CODE 258: Performed by: ORTHOPAEDIC SURGERY

## 2021-07-28 PROCEDURE — 700101 HCHG RX REV CODE 250: Performed by: ANESTHESIOLOGY

## 2021-07-28 PROCEDURE — 81025 URINE PREGNANCY TEST: CPT

## 2021-07-28 PROCEDURE — A9270 NON-COVERED ITEM OR SERVICE: HCPCS | Performed by: ORTHOPAEDIC SURGERY

## 2021-07-28 PROCEDURE — 160009 HCHG ANES TIME/MIN: Performed by: ORTHOPAEDIC SURGERY

## 2021-07-28 PROCEDURE — A9270 NON-COVERED ITEM OR SERVICE: HCPCS | Performed by: ANESTHESIOLOGY

## 2021-07-28 PROCEDURE — 160041 HCHG SURGERY MINUTES - EA ADDL 1 MIN LEVEL 4: Performed by: ORTHOPAEDIC SURGERY

## 2021-07-28 PROCEDURE — 501838 HCHG SUTURE GENERAL: Performed by: ORTHOPAEDIC SURGERY

## 2021-07-28 RX ORDER — ROPIVACAINE HYDROCHLORIDE 5 MG/ML
INJECTION, SOLUTION EPIDURAL; INFILTRATION; PERINEURAL
Status: DISCONTINUED | OUTPATIENT
Start: 2021-07-28 | End: 2021-07-28 | Stop reason: HOSPADM

## 2021-07-28 RX ORDER — MIDAZOLAM HYDROCHLORIDE 1 MG/ML
INJECTION INTRAMUSCULAR; INTRAVENOUS PRN
Status: DISCONTINUED | OUTPATIENT
Start: 2021-07-28 | End: 2021-07-28 | Stop reason: SURG

## 2021-07-28 RX ORDER — HYDRALAZINE HYDROCHLORIDE 20 MG/ML
5 INJECTION INTRAMUSCULAR; INTRAVENOUS
Status: DISCONTINUED | OUTPATIENT
Start: 2021-07-28 | End: 2021-07-28 | Stop reason: HOSPADM

## 2021-07-28 RX ORDER — SODIUM CHLORIDE, SODIUM LACTATE, POTASSIUM CHLORIDE, CALCIUM CHLORIDE 600; 310; 30; 20 MG/100ML; MG/100ML; MG/100ML; MG/100ML
INJECTION, SOLUTION INTRAVENOUS CONTINUOUS
Status: DISCONTINUED | OUTPATIENT
Start: 2021-07-28 | End: 2021-07-28 | Stop reason: HOSPADM

## 2021-07-28 RX ORDER — ONDANSETRON 4 MG/1
4 TABLET, FILM COATED ORAL EVERY 4 HOURS PRN
Qty: 15 TABLET | Refills: 0 | Status: SHIPPED | OUTPATIENT
Start: 2021-07-28 | End: 2022-10-05

## 2021-07-28 RX ORDER — KETOROLAC TROMETHAMINE 30 MG/ML
INJECTION, SOLUTION INTRAMUSCULAR; INTRAVENOUS PRN
Status: DISCONTINUED | OUTPATIENT
Start: 2021-07-28 | End: 2021-07-28 | Stop reason: SURG

## 2021-07-28 RX ORDER — DIPHENHYDRAMINE HYDROCHLORIDE 50 MG/ML
12.5 INJECTION INTRAMUSCULAR; INTRAVENOUS
Status: DISCONTINUED | OUTPATIENT
Start: 2021-07-28 | End: 2021-07-28 | Stop reason: HOSPADM

## 2021-07-28 RX ORDER — ONDANSETRON 2 MG/ML
INJECTION INTRAMUSCULAR; INTRAVENOUS PRN
Status: DISCONTINUED | OUTPATIENT
Start: 2021-07-28 | End: 2021-07-28 | Stop reason: SURG

## 2021-07-28 RX ORDER — HALOPERIDOL 5 MG/ML
1 INJECTION INTRAMUSCULAR
Status: DISCONTINUED | OUTPATIENT
Start: 2021-07-28 | End: 2021-07-28 | Stop reason: HOSPADM

## 2021-07-28 RX ORDER — HYDROCODONE BITARTRATE AND ACETAMINOPHEN 5; 325 MG/1; MG/1
1 TABLET ORAL EVERY 6 HOURS PRN
Qty: 15 TABLET | Refills: 0 | Status: SHIPPED | OUTPATIENT
Start: 2021-07-28 | End: 2021-08-01

## 2021-07-28 RX ORDER — MIDAZOLAM HYDROCHLORIDE 1 MG/ML
1 INJECTION INTRAMUSCULAR; INTRAVENOUS
Status: DISCONTINUED | OUTPATIENT
Start: 2021-07-28 | End: 2021-07-28 | Stop reason: HOSPADM

## 2021-07-28 RX ORDER — OXYCODONE HCL 5 MG/5 ML
5 SOLUTION, ORAL ORAL
Status: COMPLETED | OUTPATIENT
Start: 2021-07-28 | End: 2021-07-28

## 2021-07-28 RX ORDER — LIDOCAINE HYDROCHLORIDE 20 MG/ML
INJECTION, SOLUTION EPIDURAL; INFILTRATION; INTRACAUDAL; PERINEURAL PRN
Status: DISCONTINUED | OUTPATIENT
Start: 2021-07-28 | End: 2021-07-28 | Stop reason: SURG

## 2021-07-28 RX ORDER — ONDANSETRON 2 MG/ML
4 INJECTION INTRAMUSCULAR; INTRAVENOUS
Status: DISCONTINUED | OUTPATIENT
Start: 2021-07-28 | End: 2021-07-28 | Stop reason: HOSPADM

## 2021-07-28 RX ORDER — CLINDAMYCIN PHOSPHATE 150 MG/ML
INJECTION, SOLUTION INTRAVENOUS PRN
Status: DISCONTINUED | OUTPATIENT
Start: 2021-07-28 | End: 2021-07-28 | Stop reason: SURG

## 2021-07-28 RX ORDER — HYDROMORPHONE HYDROCHLORIDE 1 MG/ML
0.1 INJECTION, SOLUTION INTRAMUSCULAR; INTRAVENOUS; SUBCUTANEOUS
Status: DISCONTINUED | OUTPATIENT
Start: 2021-07-28 | End: 2021-07-28 | Stop reason: HOSPADM

## 2021-07-28 RX ORDER — HYDROMORPHONE HYDROCHLORIDE 1 MG/ML
0.2 INJECTION, SOLUTION INTRAMUSCULAR; INTRAVENOUS; SUBCUTANEOUS
Status: DISCONTINUED | OUTPATIENT
Start: 2021-07-28 | End: 2021-07-28 | Stop reason: HOSPADM

## 2021-07-28 RX ORDER — MEPERIDINE HYDROCHLORIDE 25 MG/ML
6.25 INJECTION INTRAMUSCULAR; INTRAVENOUS; SUBCUTANEOUS
Status: DISCONTINUED | OUTPATIENT
Start: 2021-07-28 | End: 2021-07-28 | Stop reason: HOSPADM

## 2021-07-28 RX ORDER — OXYCODONE HCL 5 MG/5 ML
10 SOLUTION, ORAL ORAL
Status: COMPLETED | OUTPATIENT
Start: 2021-07-28 | End: 2021-07-28

## 2021-07-28 RX ORDER — LABETALOL HYDROCHLORIDE 5 MG/ML
5 INJECTION, SOLUTION INTRAVENOUS
Status: DISCONTINUED | OUTPATIENT
Start: 2021-07-28 | End: 2021-07-28 | Stop reason: HOSPADM

## 2021-07-28 RX ORDER — HYDROMORPHONE HYDROCHLORIDE 1 MG/ML
0.4 INJECTION, SOLUTION INTRAMUSCULAR; INTRAVENOUS; SUBCUTANEOUS
Status: DISCONTINUED | OUTPATIENT
Start: 2021-07-28 | End: 2021-07-28 | Stop reason: HOSPADM

## 2021-07-28 RX ORDER — DEXAMETHASONE SODIUM PHOSPHATE 4 MG/ML
INJECTION, SOLUTION INTRA-ARTICULAR; INTRALESIONAL; INTRAMUSCULAR; INTRAVENOUS; SOFT TISSUE PRN
Status: DISCONTINUED | OUTPATIENT
Start: 2021-07-28 | End: 2021-07-28 | Stop reason: SURG

## 2021-07-28 RX ADMIN — FENTANYL CITRATE 50 MCG: 50 INJECTION, SOLUTION INTRAMUSCULAR; INTRAVENOUS at 13:13

## 2021-07-28 RX ADMIN — DEXAMETHASONE SODIUM PHOSPHATE 8 MG: 4 INJECTION, SOLUTION INTRAMUSCULAR; INTRAVENOUS at 12:40

## 2021-07-28 RX ADMIN — POVIDONE IODINE 15 ML: 100 SOLUTION TOPICAL at 11:25

## 2021-07-28 RX ADMIN — FENTANYL CITRATE 50 MCG: 50 INJECTION, SOLUTION INTRAMUSCULAR; INTRAVENOUS at 12:50

## 2021-07-28 RX ADMIN — MIDAZOLAM HYDROCHLORIDE 2 MG: 1 INJECTION, SOLUTION INTRAMUSCULAR; INTRAVENOUS at 12:36

## 2021-07-28 RX ADMIN — SODIUM CHLORIDE, POTASSIUM CHLORIDE, SODIUM LACTATE AND CALCIUM CHLORIDE: 600; 310; 30; 20 INJECTION, SOLUTION INTRAVENOUS at 11:25

## 2021-07-28 RX ADMIN — PROPOFOL 200 MG: 10 INJECTION, EMULSION INTRAVENOUS at 12:39

## 2021-07-28 RX ADMIN — LIDOCAINE HYDROCHLORIDE 100 MG: 20 INJECTION, SOLUTION EPIDURAL; INFILTRATION; INTRACAUDAL; PERINEURAL at 12:39

## 2021-07-28 RX ADMIN — LIDOCAINE HYDROCHLORIDE 0.5 ML: 10 INJECTION, SOLUTION INFILTRATION; PERINEURAL at 11:25

## 2021-07-28 RX ADMIN — OXYCODONE HYDROCHLORIDE 10 MG: 5 SOLUTION ORAL at 13:53

## 2021-07-28 RX ADMIN — KETOROLAC TROMETHAMINE 30 MG: 30 INJECTION, SOLUTION INTRAMUSCULAR at 13:09

## 2021-07-28 RX ADMIN — ONDANSETRON 4 MG: 2 INJECTION INTRAMUSCULAR; INTRAVENOUS at 12:40

## 2021-07-28 RX ADMIN — EPHEDRINE SULFATE 10 MG: 50 INJECTION INTRAMUSCULAR; INTRAVENOUS; SUBCUTANEOUS at 13:07

## 2021-07-28 RX ADMIN — FENTANYL CITRATE 50 MCG: 50 INJECTION, SOLUTION INTRAMUSCULAR; INTRAVENOUS at 12:41

## 2021-07-28 RX ADMIN — CLINDAMYCIN PHOSPHATE 900 MG: 150 INJECTION, SOLUTION INTRAMUSCULAR; INTRAVENOUS at 12:39

## 2021-07-28 ASSESSMENT — PAIN DESCRIPTION - PAIN TYPE
TYPE: SURGICAL PAIN

## 2021-07-28 ASSESSMENT — FIBROSIS 4 INDEX: FIB4 SCORE: 0.43

## 2021-07-28 ASSESSMENT — PAIN SCALES - GENERAL: PAIN_LEVEL: 3

## 2021-07-28 NOTE — ANESTHESIA PROCEDURE NOTES
Airway    Date/Time: 7/28/2021 12:39 PM  Performed by: Grey Iqbal M.D.  Authorized by: Grey Iqbal M.D.     Location:  OR  Urgency:  Elective  Indications for Airway Management:  Anesthesia      Spontaneous Ventilation: absent    Sedation Level:  Deep  Preoxygenated: Yes    Final Airway Type:  Supraglottic airway  Final Supraglottic Airway:  Standard LMA    SGA Size:  4  Number of Attempts at Approach:  1

## 2021-07-28 NOTE — ANESTHESIA POSTPROCEDURE EVALUATION
Patient: Ozzy Do    Procedure Summary     Date: 07/28/21 Room / Location:  OR 06 / SURGERY [facility]    Anesthesia Start: 1236 Anesthesia Stop: 1318    Procedures:       ARTHROSCOPY, KNEE (Left Knee)      MENISCECTOMY, KNEE, MEDIAL (Left Knee) Diagnosis:       Complex tear of medial meniscus of left knee as current injury, initial encounter      (Complex tear of medial meniscus of left knee as current injury, initial encounter [S83.232A])    Surgeons: Rick Christensen M.D. Responsible Provider: Grey Iqbal M.D.    Anesthesia Type: general ASA Status: 2          Final Anesthesia Type: general  Last vitals  BP   Blood Pressure: 140/78    Temp   36.4 °C (97.5 °F)    Pulse   69   Resp   16    SpO2   97 %      Anesthesia Post Evaluation    Patient location during evaluation: PACU  Patient participation: complete - patient participated  Level of consciousness: awake and alert  Pain score: 3    Airway patency: patent  Anesthetic complications: no  Cardiovascular status: hemodynamically stable  Respiratory status: acceptable  Hydration status: euvolemic    PONV: none          No complications documented.     Nurse Pain Score: 3 (NPRS)

## 2021-07-28 NOTE — ANESTHESIA PREPROCEDURE EVALUATION
Relevant Problems   ANESTHESIA   (positive) PONV (postoperative nausea and vomiting)      PULMONARY   (positive) Asthma      NEURO (within normal limits)      CARDIAC (within normal limits)      GI (within normal limits)       (within normal limits)      ENDO (within normal limits)      Other   (positive) Palpitations       Physical Exam    Airway   Mallampati: II  TM distance: >3 FB  Neck ROM: full       Cardiovascular - normal exam  Rhythm: regular  Rate: normal  (-) murmur     Dental - normal exam           Pulmonary - normal exam  Breath sounds clear to auscultation     Abdominal    Neurological - normal exam                 Anesthesia Plan    ASA 2       Plan - general       Airway plan will be LMA          Induction: intravenous    Postoperative Plan: Postoperative administration of opioids is intended.    Pertinent diagnostic labs and testing reviewed    Informed Consent:    Anesthetic plan and risks discussed with patient.    Use of blood products discussed with: patient whom consented to blood products.

## 2021-07-28 NOTE — ANESTHESIA TIME REPORT
Anesthesia Start and Stop Event Times     Date Time Event    7/28/2021 1232 Ready for Procedure     1236 Anesthesia Start     1318 Anesthesia Stop        Responsible Staff  07/28/21    Name Role Begin End    Grey Iqbal M.D. Anesth 1236 1318        Preop Diagnosis (Free Text):  Pre-op Diagnosis     Complex tear of medial meniscus of left knee as current injury, initial encounter [S83.232A]        Preop Diagnosis (Codes):  Diagnosis Information     Diagnosis Code(s): Complex tear of medial meniscus of left knee as current injury, initial encounter [S83.232A]        Post op Diagnosis  Complex tear of medial meniscus of left knee as current injury, initial encounter      Premium Reason  Non-Premium    Comments:

## 2021-07-28 NOTE — LETTER
June 11, 2021    [facility]  [address] [address]  [phone]    Patient Name: Ozzy Do  Surgeon Name: Surgeon(s):  Rick Christensen M.D.  Surgery Facility: [facility] ([address] [facility])  Surgery Date: 7/28/2021    The time of your surgery is not final and may change up to and until the day of your surgery. You will be contacted 24-48 hours prior to your surgery date with your check-in and surgery time.    If you will not be at one of the below numbers please call/text the surgery scheduler at   Cell Phone: [phone]    BEFORE YOUR SURGERY  Pre Registration and/or Lab Work must be done within and no earlier than 28 days prior to your surgery date. Please call [phone] for an appointment as soon as possible.    Please refrain from smoking any substance after midnight prior to surgery. Smoking may interfere with the anesthetic and frequently produces nausea during the recovery period.    Continue taking all lifesaving medications. Including the morning of your surgery with small sip of water.    Please read the MEDICATION INSTRUCTIONS below completely.    DAY OF YOUR SURGERY  Nothing to eat or drink after midnight     Please arrive at the hospital/surgery center at the check-in time provided.     An adult will need to bring you and take you home after your surgery.     AFTER YOUR SURGERY  Post op Appointment:   Date: 08/05/2021   Time: 09:00am   With: Rick Christensen MD   Location: [address] [place] [phone]    - Therapy- Your first appointment should be 7-10  day(s) after your surgery. For your convenience we have 4 Physical Therapy locations: [facility], [place], and [facility]. Call our office ASAP to schedule an appointment at [phone] or take the enclosed Therapy Prescription to a facility of your choice.    TIME OFF WORK  FMLA or Disability forms can be faxed directly to: [phone] or you may  drop them off at [address] [place] [phone]. Our office charges a $20.00 fee per form. Forms will be completed within 10 business days of drop off and payment received. For the status of your forms you may contact our disability office directly [phone].    MEDICATION INSTRUCTIONS  The following medications should be stopped a minimum of 10 days prior to surgery:  All over the counter, Supplements & Herbal medications    Anorectics: Phentermine (Adipex-P, Lomaira and Suprenza), Phentermine-topiramate (Qsymia), Bupropion-naltrexone (Contrave)    Opiod Partial Agonists/Opioid Antagonists: Buprenorphine (Subocone, Belbuca, Butrans, Probuphine Implant, Sublocade), Naltrexone (ReVia, Vivitrol), Naloxone    Amphetamines: Dextroamphetamine/Amphetamine (Adderall, Mydayis), Methylphenidate Hydrochloride (Concerta, Metadate, Methylin, Ritalin)    The following medications should be stopped 5 days prior to surgery:  Blood Thinners: Any Aspirin, Aspirin products, anti-inflammatories such as ibuprofen and any blood thinners such as Coumadin and Plavix. Please consult your prescribing physician if you are on life saving blood thinners, in regards to when to stop medications prior to surgery.     The following medications should be stopped a minimum of 3 days prior to surgery:  PDE-5 inhibitors: Sildenafil (Viagra), Tadalafil (Cialis), Vardenafil (Levitra), Avanafil (Stendra)    MAO Inhibitors: Rasagiline (Azilect), Selegiline (Eldepryl, Emsam, Selapar), Isocarboxazid (Marplan), Phenelzine (Nardil)

## 2021-07-28 NOTE — H&P
"Surgery Orthopedic History & Physical Note    Date  2021    Primary Care Physician  Jackie Hansen M.D.    CC  Pre-Op Diagnosis Codes:     * Complex tear of medial meniscus of left knee as current injury, initial encounter [S83.232A]    HPI  This is a 41 y.o. female who presented with L knee pain after an injury. Her pain limits her activity and her sleep.     Past Medical History:   Diagnosis Date   • Asthma    • Awareness under anesthesia    • PONV (postoperative nausea and vomiting)    • Psychiatric problem     Depression       Past Surgical History:   Procedure Laterality Date   • OTHER ORTHOPEDIC SURGERY Right     Shoulder   • OTHER      Tooth extraction X1       Current Facility-Administered Medications   Medication Dose Route Frequency Provider Last Rate Last Admin   • lidocaine (XYLOCAINE) 1 % injection 0.5 mL  0.5 mL Intradermal Once PRN Rick Christensen M.D.   0.5 mL at 21 1125   • lactated ringers infusion   Intravenous Continuous Rick Christensen M.D. 10 mL/hr at 21 1125 New Bag at 21 1125       Social History     Socioeconomic History   • Marital status:      Spouse name: Not on file   • Number of children: Not on file   • Years of education: Not on file   • Highest education level: Not on file   Occupational History   • Not on file   Tobacco Use   • Smoking status: Former Smoker     Start date:      Quit date: 2016     Years since quittin.5   • Smokeless tobacco: Never Used   • Tobacco comment: \"On and off\"   Vaping Use   • Vaping Use: Former   • Substances: Nicotine   Substance and Sexual Activity   • Alcohol use: Yes     Comment: occasional    • Drug use: Yes     Types: Oral     Comment: THC   • Sexual activity: Yes     Partners: Male     Birth control/protection: Pill   Other Topics Concern   • Not on file   Social History Narrative   • Not on file     Social Determinants of Health     Financial Resource Strain:    • Difficulty of Paying Living Expenses:  "   Food Insecurity:    • Worried About Running Out of Food in the Last Year:    • Ran Out of Food in the Last Year:    Transportation Needs:    • Lack of Transportation (Medical):    • Lack of Transportation (Non-Medical):    Physical Activity:    • Days of Exercise per Week:    • Minutes of Exercise per Session:    Stress:    • Feeling of Stress :    Social Connections:    • Frequency of Communication with Friends and Family:    • Frequency of Social Gatherings with Friends and Family:    • Attends Buddhist Services:    • Active Member of Clubs or Organizations:    • Attends Club or Organization Meetings:    • Marital Status:    Intimate Partner Violence:    • Fear of Current or Ex-Partner:    • Emotionally Abused:    • Physically Abused:    • Sexually Abused:        Family History   Problem Relation Age of Onset   • Cancer Paternal Grandmother    • Heart Disease Father    • Heart Failure Father        Allergies  Tape and Ceclor [cefaclor]    Review of Systems  Negative    Physical Exam  Constitutional:       Appearance: Normal appearance.   HENT:      Head: Normocephalic and atraumatic.      Right Ear: External ear normal.      Left Ear: External ear normal.      Nose: Nose normal.      Mouth/Throat:      Pharynx: Oropharynx is clear.   Eyes:      Conjunctiva/sclera: Conjunctivae normal.   Cardiovascular:      Rate and Rhythm: Normal rate and regular rhythm.      Pulses: Normal pulses.   Pulmonary:      Effort: Pulmonary effort is normal.   Abdominal:      Palpations: Abdomen is soft.   Musculoskeletal:      Cervical back: Normal range of motion.      Comments: L knee with FROM with end range pain. TTP medial Jt line. + Arti, no instability. DNVI   Skin:     General: Skin is warm.   Neurological:      General: No focal deficit present.      Mental Status: She is alert and oriented to person, place, and time.   Psychiatric:         Mood and Affect: Mood normal.         Behavior: Behavior normal.         Vital  Signs  Blood Pressure: 119/79   Temperature: 36.1 °C (97 °F)   Pulse: 69   Respiration: 16   Pulse Oximetry: 99 %       Labs:                    Radiology:  No orders to display         Assessment/Plan:  Pre-Op Diagnosis Codes:     * Complex tear of medial meniscus of left knee as current injury, initial encounter [S83.232A]  Procedure(s):  ARTHROSCOPY, KNEE  MENISCECTOMY, KNEE, MEDIAL

## 2021-07-28 NOTE — DISCHARGE INSTRUCTIONS
ACTIVITY: Rest and take it easy for the first 24 hours.  A responsible adult is recommended to remain with you during that time.  It is normal to feel sleepy.  We encourage you to not do anything that requires balance, judgment or coordination.    MILD FLU-LIKE SYMPTOMS ARE NORMAL. YOU MAY EXPERIENCE GENERALIZED MUSCLE ACHES, THROAT IRRITATION, HEADACHE AND/OR SOME NAUSEA.    FOR 24 HOURS DO NOT:  Drive, operate machinery or run household appliances.  Drink beer or alcoholic beverages.   Make important decisions or sign legal documents.    SPECIAL INSTRUCTIONS: see instructions below    DIET: To avoid nausea, slowly advance diet as tolerated, avoiding spicy or greasy foods for the first day.  Add more substantial food to your diet according to your physician's instructions.  Babies can be fed formula or breast milk as soon as they are hungry.  INCREASE FLUIDS AND FIBER TO AVOID CONSTIPATION.    SURGICAL DRESSING/BATHING:   May remove dressings Post op Day #2 and Shower with wound uncovered.  Apply bandaids after shower.  Do not soak or submerge incisions for two weeks. Ice and elevate extremity.     FOLLOW-UP APPOINTMENT:  A follow-up appointment should be arranged with your doctor in Follow up 7-10 days; call to schedule.    You should CALL YOUR PHYSICIAN if you develop:  Fever greater than 101 degrees F.  Pain not relieved by medication, or persistent nausea or vomiting.  Excessive bleeding (blood soaking through dressing) or unexpected drainage from the wound.  Extreme redness or swelling around the incision site, drainage of pus or foul smelling drainage.  Inability to urinate or empty your bladder within 8 hours.  Problems with breathing or chest pain.    You should call 911 if you develop problems with breathing or chest pain.  If you are unable to contact your doctor or surgical center, you should go to the nearest emergency room or urgent care center.  Physician's telephone #: [phone]    If any questions  arise, call your doctor.  If your doctor is not available, please feel free to call the Surgical Center at [phone]. The Contact Center is open Monday through Friday 7AM to 5PM and may speak to a nurse at [phone], or toll free at [phone].     A registered nurse may call you a few days after your surgery to see how you are doing after your procedure.    MEDICATIONS: Resume taking daily medication.  Take prescribed pain medication with food.  If no medication is prescribed, you may take non-aspirin pain medication if needed.  PAIN MEDICATION CAN BE VERY CONSTIPATING.  Take a stool softener or laxative such as senokot, pericolace, or milk of magnesia if needed.    Prescription given for n/a.  Last pain medication given at 2 pm next due 6 pm.    If your physician has prescribed pain medication that includes Acetaminophen (Tylenol), do not take additional Acetaminophen (Tylenol) while taking the prescribed medication.    Depression / Suicide Risk    As you are discharged from this [facility] facility, it is important to learn how to keep safe from harming yourself.    Recognize the warning signs:  · Abrupt changes in personality, positive or negative- including increase in energy   · Giving away possessions  · Change in eating patterns- significant weight changes-  positive or negative  · Change in sleeping patterns- unable to sleep or sleeping all the time   · Unwillingness or inability to communicate  · Depression  · Unusual sadness, discouragement and loneliness  · Talk of wanting to die  · Neglect of personal appearance   · Rebelliousness- reckless behavior  · Withdrawal from people/activities they love  · Confusion- inability to concentrate     If you or a loved one observes any of these behaviors or has concerns about self-harm, here's what you can do:  · Talk about it- your feelings and reasons for harming yourself  · Remove any means that you might use to hurt yourself (examples: pills, rope,  extension cords, firearm)  · Get professional help from the community (Mental Health, Substance Abuse, psychological counseling)  · Do not be alone:Call your Safe Contact- someone whom you trust who will be there for you.  · Call your local CRISIS HOTLINE 196-8894 or 362-184-0262  · Call your local Children's Mobile Crisis Response Team [place] (875) 306-8504 or www.1spire  · Call the toll free National Suicide Prevention Hotlines   · National Suicide Prevention Lifeline 141-618-SLWV (5808)  · National Hope Line Network 800-SUICIDE (161-1271)

## 2021-07-28 NOTE — OP REPORT
DATE OF OPERATION: 7/28/2021    SURGEON: Rick Christensen MD     ASSISTANT: ELVIRA Cornejo    PREOPERATIVE DIAGNOSES:  1. Left knee medial meniscus tear.    POSTOPERATIVE DIAGNOSES:  1.  Left knee anterior compartment synovitis.    PROCEDURE:  1.  Left knee arthroscopy with limited synovectomy.    ANESTHESIA: Misael Iqbal MD    ANESTHETIC: LMA anesthesia along with a local injection.    INDICATIONS: The patient has had knee pain for quite some time.  She has failed   nonoperative treatment and elected to proceed with operative intervention.  She  Was explained the risks, benefits, alternatives, procedure and recovery in   detail. All questions were answered. Informed consent was obtained. Site   verification per protocol was obtained in the pre-op holding area and the operating   room. Patient received appropriate preoperative antibiotics.    OPERATION: After successful anesthesia, the patient's knee was examined. The patient  had a stable ligamentous exam and good range of motion. The leg was then prepped   and draped in the usual sterile fashion. An anterolateral portal was   established with a knife and blunt trocar into the suprapatellar pouch. The   suprapatellar pouch and the medial and lateral gutters were free of abnormalities.   The patella had normal articular cartilage on the patella and trochlea.  The patellofemoral jointtracked well.  The medial joint line was thoroughly inspected.  There was no evidence of tear from medial or lateral portals.  There was just some small redundant tissue on the underside of the mid body of the meniscus but no evidence of a tear.  This was well viewed.  I also viewed the meniscus from the posterior aspect of the knee without evidence of tear.  The   articular cartilage on the medial femoral condyle was normal. The notch revealed a normal ACL and PCL to visual   inspection and probing. The lateral compartment had normal articular   cartilage and the meniscus was normal to  visual inspection and probing.  She did have some anterior compartment synovitis and a small plica.  This was debrided with a shaver.  I felt this eliminated any impingement.  At   that point, I was satisfied with the procedure. I lavaged out the joint,   suctioned out the fluid, closed the portals with 3-0 Prolene in interrupted   fashion. Xeroform, 4x4s, and an ACE wrap were applied.   Patient was transferred to recovery room in stable condition.    ESTIMATED BLOOD LOSS: Minimal.    COMPLICATIONS: None.    ELVIRA Cornejo  was medically necessary for the case. They helped with   instrumentation, retraction, and positioning. Without their help, the case   would not have been as technically successful.        ____________________________________  DEEPTHI PÉREZ MD

## 2021-08-09 ENCOUNTER — PHYSICAL THERAPY (OUTPATIENT)
Dept: PHYSICAL THERAPY | Facility: REHABILITATION | Age: 41
End: 2021-08-09
Attending: ORTHOPAEDIC SURGERY
Payer: COMMERCIAL

## 2021-08-09 DIAGNOSIS — S83.232A COMPLEX TEAR OF MEDIAL MENISCUS OF LEFT KNEE AS CURRENT INJURY, INITIAL ENCOUNTER: ICD-10-CM

## 2021-08-09 PROCEDURE — 97014 ELECTRIC STIMULATION THERAPY: CPT

## 2021-08-09 PROCEDURE — 97110 THERAPEUTIC EXERCISES: CPT

## 2021-08-09 PROCEDURE — 97161 PT EVAL LOW COMPLEX 20 MIN: CPT

## 2021-08-09 ASSESSMENT — ENCOUNTER SYMPTOMS
EXACERBATED BY: SQUATTING
PAIN LOCATION: LEFT KNEE
PAIN SCALE AT LOWEST: 0
QUALITY: ACHING
EXACERBATED BY: STAIR CLIMBING
QUALITY: TIGHT
ALLEVIATING FACTORS: ICE
PAIN SCALE AT HIGHEST: 4
PAIN SCALE: 2
ALLEVIATING FACTORS: PAIN MEDICATION
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
## 2021-08-16 ENCOUNTER — PHYSICAL THERAPY (OUTPATIENT)
Dept: PHYSICAL THERAPY | Facility: REHABILITATION | Age: 41
End: 2021-08-16
Attending: ORTHOPAEDIC SURGERY
Payer: COMMERCIAL

## 2021-08-16 DIAGNOSIS — S83.232A COMPLEX TEAR OF MEDIAL MENISCUS OF LEFT KNEE AS CURRENT INJURY, INITIAL ENCOUNTER: ICD-10-CM

## 2021-08-16 PROCEDURE — 97110 THERAPEUTIC EXERCISES: CPT

## 2021-08-16 PROCEDURE — 97140 MANUAL THERAPY 1/> REGIONS: CPT

## 2021-08-16 NOTE — OP THERAPY DAILY TREATMENT
Outpatient Physical Therapy  DAILY TREATMENT     [facility]patient [facility] Therapy [address] [facility], [address]  [place] [address]  Phone:  [phone]    Date: 08/16/2021    Patient: Ozzy Do  YOB: 1980  MRN: [id number]     Time Calculation    Start time: 1345  Stop time: 1435 Time Calculation (min): 50 minutes         Chief Complaint: No chief complaint on file.    Visit #: 2    SUBJECTIVE:  Overdid at work 4 hours standing increased left knee aggravation 4/10 VAS    OBJECTIVE:  Current objective measures: 0-125 left knee AROM          Therapeutic Exercises (CPT 37580):     1. See below    2. Bike no resistance, x 10 min    3. SLR small range with quad set supine and sidelying, 3 x19    4. Long arc quad 5lb ankle cuff weight , 2 x20     5. Hamstring curls , 2 x 30 no resistance prone    6. Lateral step down, 3 inch step 2 x 10    7. Step up, 3 9inch step 2 x 10    8. Repeated  knee flexion on step , x 20    9. Quad set seated with overpressure, x 20      Therapeutic Exercise Summary: Access Code: [email or web address]  URL: [email or web address]  Date: 08/09/2021  Prepared by: Nely Pastor    Exercises  •Seated Quad Set - 6 x daily - 7 x weekly - 1 sets - 10 reps  •Supine Quad Set - 6 x daily - 7 x weekly - 1 sets - 10 reps  •Seated Long Arc Quad - 1 x daily - 7 x weekly - 3 sets - 10 reps  •Seated Knee Flexion Extension AROM - 1 x daily - 7 x weekly - 3 sets - 10 reps  •Supine Hip and Knee Flexion AROM with Swiss Ball - 1 x daily - 7 x weekly - 3 sets - 10 reps  •Small Range Straight Leg Raise - 1 x daily - 7 x weekly - 3 sets - 10 reps  •Seated Gastroc Stretch with Strap - 3 x daily - 7 x weekly - 1 sets - 10 reps  •Mini Squat with Counter Support - 3 x daily - 7 x weekly - 1 sets - 10 reps  •Standing Weight Shift - 3 x daily - 7 x weekly - 1 sets - 10 reps        Therapeutic Treatments and Modalities:     1. Manual Therapy (CPT 73433), tibifemoral joint mob into extenson, P-F joint mobs  in all directions    Time-based treatments/modalities:    Physical Therapy Timed Treatment Charges  Manual therapy minutes (CPT 32490): 8 minutes  Therapeutic exercise minutes (CPT 89621): 35 minutes    ASSESSMENT:   Response to treatment: progressing nicely but continued inhibited quad control.  Normalized gait with no limp today.      PLAN/RECOMMENDATIONS:   Plan for treatment: therapy treatment to continue next visit.  Planned interventions for next visit: continue with current treatment.

## 2021-08-19 ENCOUNTER — PHYSICAL THERAPY (OUTPATIENT)
Dept: PHYSICAL THERAPY | Facility: REHABILITATION | Age: 41
End: 2021-08-19
Attending: ORTHOPAEDIC SURGERY
Payer: COMMERCIAL

## 2021-08-19 DIAGNOSIS — S83.232A COMPLEX TEAR OF MEDIAL MENISCUS OF LEFT KNEE AS CURRENT INJURY, INITIAL ENCOUNTER: ICD-10-CM

## 2021-08-19 PROCEDURE — 97140 MANUAL THERAPY 1/> REGIONS: CPT

## 2021-08-19 PROCEDURE — 97110 THERAPEUTIC EXERCISES: CPT

## 2021-08-19 NOTE — OP THERAPY DAILY TREATMENT
Outpatient Physical Therapy  DAILY TREATMENT     [facility] [place] [id number] [facility], [address]  [place] [address]  Phone:  [phone]    Date: 08/19/2021    Patient: Shelia Do  YOB: 1980  MRN: [id number]     Time Calculation    Start time: 1300  Stop time: 1350 Time Calculation (min): 50 minutes         Chief Complaint: No chief complaint on file.    Visit #: 3    SUBJECTIVE:  Aggravated knee yesterday after doing exercises increased swelling and pain 9/10 VAS calmed to a 3/10 presently    OBJECTIVE:  Current objective measures: 0-120 knee flex           Therapeutic Exercises (CPT 94195):     2. Bike no resistance, x 10 min    3. SLR small range with quad set supine and sidelying, 3 x10 3lb weight     4. Shuttle 3 cords DL squat, x 30    5. Hamstring curls , 3x 10 no resistance prone    6. Lateral step down, 3 inch step 2 x 10    7. Step up, 3 nch step 2 x 10    8. Repeated  knee flexion on step , x 20    9. Quad set seated with overpressure, x 20      Therapeutic Exercise Summary: Access Code: [email or web address]  URL: [email or web address]  Date: 08/09/2021  Prepared by: Nely Pastor    Exercises  •Seated Quad Set - 6 x daily - 7 x weekly - 1 sets - 10 reps  •Supine Quad Set - 6 x daily - 7 x weekly - 1 sets - 10 reps  •Seated Long Arc Quad - 1 x daily - 7 x weekly - 3 sets - 10 reps  •Seated Knee Flexion Extension AROM - 1 x daily - 7 x weekly - 3 sets - 10 reps  •Supine Hip and Knee Flexion AROM with Swiss Ball - 1 x daily - 7 x weekly - 3 sets - 10 reps  •Small Range Straight Leg Raise - 1 x daily - 7 x weekly - 3 sets - 10 reps  •Seated Gastroc Stretch with Strap - 3 x daily - 7 x weekly - 1 sets - 10 reps  •Mini Squat with Counter Support - 3 x daily - 7 x weekly - 1 sets - 10 reps  •Standing Weight Shift - 3 x daily - 7 x weekly - 1 sets - 10 reps        Therapeutic Treatments and Modalities:     1. Manual Therapy (CPT 40626), tibifemoral joint mob into extenson,  P-F joint mobs in all directions    Time-based treatments/modalities:    Physical Therapy Timed Treatment Charges  Gait training minutes (CPT 10748): 5 minutes  Manual therapy minutes (CPT 38950): 8 minutes  Therapeutic exercise minutes (CPT 85180): 30 minutes    ASSESSMENT:   Response to treatment: progressing well with quad control and AROM 0-120, infrapatellar swelling.  Open chain knee extension is aggravating so that exercise was D/Cd.      PLAN/RECOMMENDATIONS:   Plan for treatment: therapy treatment to continue next visit.  Planned interventions for next visit: continue with current treatment.

## 2021-08-24 ENCOUNTER — PHYSICAL THERAPY (OUTPATIENT)
Dept: PHYSICAL THERAPY | Facility: REHABILITATION | Age: 41
End: 2021-08-24
Attending: ORTHOPAEDIC SURGERY
Payer: COMMERCIAL

## 2021-08-24 DIAGNOSIS — S83.232A COMPLEX TEAR OF MEDIAL MENISCUS OF LEFT KNEE AS CURRENT INJURY, INITIAL ENCOUNTER: ICD-10-CM

## 2021-08-24 PROCEDURE — 97110 THERAPEUTIC EXERCISES: CPT

## 2021-08-24 NOTE — OP THERAPY DAILY TREATMENT
Outpatient Physical Therapy  DAILY TREATMENT     [facility] [place] [address] [facility], [address]  [place] [address]  Phone:  [phone]    Date: 08/24/2021    Patient: Estrella Do  YOB: 1980  MRN: [id number]     Time Calculation    Start time: 1100  Stop time: 1145 Time Calculation (min): 45 minutes         Chief Complaint: No chief complaint on file.    Visit #: 4    SUBJECTIVE:  Significant improvement left knee, decreased swelling, pain and improved AROM left knee    OBJECTIVE:  Current objective measures: 0-135 left knee AROM good quad control and hip neuromuscular control          Therapeutic Exercises (CPT 66396):     2. Bike L5, x 10 min    3. SLR small range with quad set supine and sidelying, 3 x10 3lb weight , NT    4. Shuttle 4cords DL , 2 SL Left, 3 cords rightsquat, x 20 each     5. Hamstring curls , 3x 10 no resistance prone    6. Lateral step down, 4 inch step 2 x 10    7. Step up, 4 inch step 2 x 10    8. Repeated  knee flexion on step , x 20    9. Quad set seated with overpressure, x10    10. Static hamstring stretch with strap, 2 x 30 sec      Therapeutic Exercise Summary: Access Code: [email or web address]  URL: [email or web address]  Date: 08/09/2021  Prepared by: Nely Pastor    Exercises  •Seated Quad Set - 6 x daily - 7 x weekly - 1 sets - 10 reps  •Supine Quad Set - 6 x daily - 7 x weekly - 1 sets - 10 reps  •Seated Long Arc Quad - 1 x daily - 7 x weekly - 3 sets - 10 reps  •Seated Knee Flexion Extension AROM - 1 x daily - 7 x weekly - 3 sets - 10 reps  •Supine Hip and Knee Flexion AROM with Swiss Ball - 1 x daily - 7 x weekly - 3 sets - 10 reps  •Small Range Straight Leg Raise - 1 x daily - 7 x weekly - 3 sets - 10 reps  •Seated Gastroc Stretch with Strap - 3 x daily - 7 x weekly - 1 sets - 10 reps  •Mini Squat with Counter Support - 3 x daily - 7 x weekly - 1 sets - 10 reps  •Standing Weight Shift - 3 x daily - 7 x weekly - 1 sets - 10  reps        Therapeutic Treatments and Modalities:     1. Manual Therapy (CPT 46891), tibifemoral joint mob into extenson, P-F joint mobs in all directions    Time-based treatments/modalities:    Physical Therapy Timed Treatment Charges  Therapeutic exercise minutes (CPT 20663): 40 minutes    ASSESSMENT:   Response to treatment: decreased patellar tendon pressure/discomfort.   progressing well with left knee strength and AROM.  Continue to progress single leg functional strength and quad control. Decreased frequency to 1 x week    PLAN/RECOMMENDATIONS:   Plan for treatment: therapy treatment to continue next visit.  Planned interventions for next visit: continue with current treatment.

## 2021-08-26 ENCOUNTER — PHYSICAL THERAPY (OUTPATIENT)
Dept: PHYSICAL THERAPY | Facility: REHABILITATION | Age: 41
End: 2021-08-26
Attending: ORTHOPAEDIC SURGERY
Payer: COMMERCIAL

## 2021-08-26 DIAGNOSIS — S83.232A COMPLEX TEAR OF MEDIAL MENISCUS OF LEFT KNEE AS CURRENT INJURY, INITIAL ENCOUNTER: ICD-10-CM

## 2021-08-26 PROCEDURE — 97140 MANUAL THERAPY 1/> REGIONS: CPT

## 2021-08-26 PROCEDURE — 97110 THERAPEUTIC EXERCISES: CPT

## 2021-08-26 NOTE — OP THERAPY DAILY TREATMENT
Outpatient Physical Therapy  DAILY TREATMENT     [facility] Outpatient Physical Therapy [address], [address]  [place] [address]  Phone:  [phone]    Date: 08/26/2021    Patient: Estrella Do  YOB: 1980  MRN: [id number]     Time Calculation    Start time: 1105  Stop time: 1145 Time Calculation (min): 40 minutes         Chief Complaint: No chief complaint on file.    Visit #: 5    SUBJECTIVE: anterior infrapatellar pain, may have overworked knee yesterday     OBJECTIVE:  Current objective measures: full left knee AROM 0-130          Therapeutic Exercises (CPT 13877):     1. Standing single leg heel raises, x 20    2. Nu step L3,  6 min     3. Bosu lunge forward and lateral, 2 x 10 each B    4. Shuttle 4cords DL , 2 SL Left, 3 cords rightsquat, x 20 each     5. Hamstring curls , 3x 10 no resistance prone    6. Lateral step down, 4 inch step 2 x 10, NT     7. Step up, 4 inch step 2 x 10, NT    8. Supported squat, x 10    9. Quad set seated with overpressure, x10    10. Static hamstring stretch with strap, 2 x 30 sec      Therapeutic Exercise Summary: Access Code: [email or web address]  URL: [email or web address]  Date: 08/09/2021  Prepared by: Nely Pastor    Exercises  •Seated Quad Set - 6 x daily - 7 x weekly - 1 sets - 10 reps  •Supine Quad Set - 6 x daily - 7 x weekly - 1 sets - 10 reps  •Seated Long Arc Quad - 1 x daily - 7 x weekly - 3 sets - 10 reps  •Seated Knee Flexion Extension AROM - 1 x daily - 7 x weekly - 3 sets - 10 reps  •Supine Hip and Knee Flexion AROM with Swiss Ball - 1 x daily - 7 x weekly - 3 sets - 10 reps  •Small Range Straight Leg Raise - 1 x daily - 7 x weekly - 3 sets - 10 reps  •Seated Gastroc Stretch with Strap - 3 x daily - 7 x weekly - 1 sets - 10 reps  •Mini Squat with Counter Support - 3 x daily - 7 x weekly - 1 sets - 10 reps  •Standing Weight Shift - 3 x daily - 7 x weekly - 1 sets - 10 reps        Therapeutic Treatments and Modalities:     1. Manual Therapy  (CPT 50850), tibifemoral joint mob into extenson, P-F joint mobs in all directions    Time-based treatments/modalities:    Physical Therapy Timed Treatment Charges  Manual therapy minutes (CPT 61547): 10 minutes  Therapeutic exercise minutes (CPT 82412): 30 minutes    ASSESSMENT:   Response to treatment: progressing nicely with improved neuromuscular control left LE and quad.  Continue to progress quad loading and single leg functional exercise    PLAN/RECOMMENDATIONS:   Plan for treatment: therapy treatment to continue next visit.  Planned interventions for next visit: continue with current treatment.

## 2021-08-31 ENCOUNTER — PHYSICAL THERAPY (OUTPATIENT)
Dept: PHYSICAL THERAPY | Facility: REHABILITATION | Age: 41
End: 2021-08-31
Attending: ORTHOPAEDIC SURGERY
Payer: COMMERCIAL

## 2021-08-31 DIAGNOSIS — S83.232A COMPLEX TEAR OF MEDIAL MENISCUS OF LEFT KNEE AS CURRENT INJURY, INITIAL ENCOUNTER: ICD-10-CM

## 2021-08-31 PROCEDURE — 97110 THERAPEUTIC EXERCISES: CPT

## 2021-08-31 PROCEDURE — 97140 MANUAL THERAPY 1/> REGIONS: CPT

## 2021-08-31 NOTE — OP THERAPY DAILY TREATMENT
"  Outpatient Physical Therapy  DAILY TREATMENT     [facility] [place] [address] [facility], [address]  [place] [address]  Phone:  [phone]    Date: 08/31/2021    Patient: Estrella Do  YOB: 1980  MRN: [id number]     Time Calculation    Start time: 1100  Stop time: 1143 Time Calculation (min): 43 minutes         Chief Complaint: Knee Problem    Visit #: 6    SUBJECTIVE:  Patient states that she woke up yesterday and performed a big stretch, engaging all muscles of her LLE, and felt a pop in the L knee followed by pain. Noted increased swelling in the L knee and had difficulty going down stairs. States that when going down stairs, she didn't fall, but noted \"crippling\" pain going down one step. Has follow up with Surgeon's PA today.     OBJECTIVE:  Current objective measures: Left knee AROM 0-135; PROM 0-145 following joint mobilizations    Noted good L ligamentous and meniscus integrity with all special tests.    Noted increased swelling surrounding L patellar tendon.             Therapeutic Exercises (CPT 21965):     1. Upright bike,  6 min  at L 4.8    3. Bosu step up and march , x 15, No catching or popping    4. Shuttle 4cords DL , 2 SL Left, 3 cords with wobble board DL squat, x 10 each , No pain with DL squat. Popping noted with SL and unstable DL squats along joint line of L knee.    5. Standing single leg heel raises, x 20 , No reproduction of symptoms    6. Static L SL stance on foam while performing R hip abduction, extension, and marching, x 10 each, No reproduction of symptoms    10. Static hamstring stretch with strap, 2 x 30 sec B    Therapeutic Treatments and Modalities:     1. Manual Therapy (CPT 04112), tibifemoral joint mob into extenson, P-F joint mobs in all directions, Re-assessment of L knee integrity; see above    Therapeutic Treatment and Modalities Summary: Education provided on returning to use of ice and elevation during this time of increased swelling. "     Time-based treatments/modalities:    Physical Therapy Timed Treatment Charges  Manual therapy minutes (CPT 53685): 10 minutes  Therapeutic exercise minutes (CPT 28450): 33 minutes      ASSESSMENT:   Response to treatment: Left knee integrity appears to be intact. Suspect that initial pop was scar tissue breaking up, especially as able to achieve greater left knee flexion ROM today. However, continues to have popping and pain with midrange knee flexion in closed chain positions. Will follow up on post-surgical appointment next visit.     PLAN/RECOMMENDATIONS:   Plan for treatment: therapy treatment to continue next visit.  Planned interventions for next visit: continue with current treatment.

## 2021-09-08 ENCOUNTER — PHYSICAL THERAPY (OUTPATIENT)
Dept: PHYSICAL THERAPY | Facility: REHABILITATION | Age: 41
End: 2021-09-08
Attending: ORTHOPAEDIC SURGERY
Payer: COMMERCIAL

## 2021-09-08 DIAGNOSIS — S83.232A COMPLEX TEAR OF MEDIAL MENISCUS OF LEFT KNEE AS CURRENT INJURY, INITIAL ENCOUNTER: ICD-10-CM

## 2021-09-08 PROCEDURE — 97110 THERAPEUTIC EXERCISES: CPT

## 2021-09-08 PROCEDURE — 97140 MANUAL THERAPY 1/> REGIONS: CPT

## 2021-09-08 NOTE — OP THERAPY DAILY TREATMENT
"  Outpatient Physical Therapy  DAILY TREATMENT     [facility] Outpatient Physical Therapy [place] [id number] [facility], [address]  [place] [address]  Phone:  [phone]    Date: 09/08/2021    Patient: Estrella Do  YOB: 1980  MRN: [id number]     Time Calculation    Start time: 1016  Stop time: 1100 Time Calculation (min): 44 minutes         Chief Complaint: No chief complaint on file.    Visit #: 7    SUBJECTIVE:  3/10 VAS left knee, slowly getting better since \"popping episode\"  Knee continues to pop especially with descending stairs     OBJECTIVE:  Current objective measures:           Therapeutic Exercises (CPT 51489):     1. Nu step , 10 min    2. Quad set supine, 10 x 10 reps     3. Bosu step up and march , x 15, NT    4. Shuttle 4cords DL , 2 SL Left, 3 cords with wobble board DL squat, x 10 each , No pain with DL squat. Popping noted with SL and unstable DL squats along joint line of L knee.    5. Standing single leg heel raises, x 20     6. Lateral step downs 4 inch,  2 x10    7. RDLS single leg, 2 x 10     8. Step downs forward 4 inch, 2 x 10    10. Static hamstring stretch with strap, 2 x 30 sec B    Therapeutic Treatments and Modalities:     1. Manual Therapy (CPT 17894), tibifemoral joint mob into extenson, P-F joint mobs in all directions    Time-based treatments/modalities:    Physical Therapy Timed Treatment Charges  Manual therapy minutes (CPT 49330): 8 minutes  Therapeutic exercise minutes (CPT 50944): 35 minutes    ASSESSMENT:   Response to treatment: good tolerance to single leg loading left knee.  Decreased irritability and no swelling.      PLAN/RECOMMENDATIONS:   Plan for treatment: therapy treatment to continue next visit.  Planned interventions for next visit: continue with current treatment.       "

## 2021-09-14 ENCOUNTER — PHYSICAL THERAPY (OUTPATIENT)
Dept: PHYSICAL THERAPY | Facility: REHABILITATION | Age: 41
End: 2021-09-14
Attending: ORTHOPAEDIC SURGERY
Payer: COMMERCIAL

## 2021-09-14 DIAGNOSIS — S83.232A COMPLEX TEAR OF MEDIAL MENISCUS OF LEFT KNEE AS CURRENT INJURY, INITIAL ENCOUNTER: ICD-10-CM

## 2021-09-14 PROCEDURE — 97110 THERAPEUTIC EXERCISES: CPT

## 2021-09-14 PROCEDURE — 97140 MANUAL THERAPY 1/> REGIONS: CPT

## 2021-09-14 NOTE — OP THERAPY DAILY TREATMENT
"  Outpatient Physical Therapy  DAILY TREATMENT     [facility] Outpatient Physical Therapy [address] [facility], [address]  [place] [address]  Phone:  [phone]    Date: 09/14/2021    Patient: Estrella Do  YOB: 1980  MRN: [id number]     Time Calculation    Start time: 1515  Stop time: 1600 Time Calculation (min): 45 minutes         Chief Complaint: No chief complaint on file.    Visit #: 8    SUBJECTIVE:  Continued painful popping with TKE unloaded and loaded left knee    OBJECTIVE:  Current objective measures: tenderness left lateral  PF joint and patellar tendon , audible pop observed with knee flexion going into extension          Therapeutic Exercises (CPT 51015):     1. Bike 5 min    2. SLR abduction and adduction on mat no resistance, x 20 each    3. TKE black tube single leg, x 20    4. Shuttle 3 cords, x 100 post taping    5. Shuttle 5 cords x 15, increased PF popping    6. Bosu step ups with contralateral march, 3 x 10    Therapeutic Treatments and Modalities:     1. Manual Therapy (CPT 38270), IASTM left patellar tendon, PF joint mobs medial glide/superior tilt/, taping PF joint left medial glide /superior tilt , decrease//better  improved quad activation and terminal knee extension with gait, decreased patient  anxiety of left knee \"popping\"    Time-based treatments/modalities:    Physical Therapy Timed Treatment Charges  Manual therapy minutes (CPT 96363): 15 minutes  Therapeutic exercise minutes (CPT 18350): 30 minutes    ASSESSMENT:   Response to treatment: Tape significantly improved left knee TKE and symptoms left PF joint with knee AROM.  Quad has been inhibited secondary to P-F dysfunction.     PLAN/RECOMMENDATIONS:   Plan for treatment: therapy treatment to continue next visit.  Planned interventions for next visit: continue with current treatment.       "

## 2021-09-14 NOTE — OP THERAPY DAILY TREATMENT
Outpatient Physical Therapy  DAILY TREATMENT     [facility] Outpatient Physical Therapy [place] [address] [employer] [address] [facility], [address]  [place] [address]  Phone:  [phone]    Date: 09/14/2021    Patient: Estrella Do  YOB: 1980  MRN: [id number]     Time Calculation                   Chief Complaint: No chief complaint on file.    Visit #: 8    SUBJECTIVE: left knee decreased overall pain but continued popping with EROM extension    OBJECTIVE:  Current objective measures:     Therapeutic Exercises (CPT 90603):     1. Nu step , 10 min    2. Quad set supine, 10 x 10 reps     3. Bosu step up and march , x 15, NT    4. Shuttle 4cords DL , 2 SL Left, 3 cords with wobble board DL squat, x 10 each , No pain with DL squat. Popping noted with SL and unstable DL squats along joint line of L knee.    5. Standing single leg heel raises, x 20     6. Lateral step downs 4 inch,  2 x10    7. RDLS single leg, 2 x 10     8. Step downs forward 4 inch, 2 x 10    10. Static hamstring stretch with strap, 2 x 30 sec B    Therapeutic Treatments and Modalities:     1. Manual Therapy (CPT 99537), tibifemoral joint mob into extenson, P-F joint mobs in all directions    Time-based treatments/modalities:         ASSESSMENT:   Response to treatment: ***    PLAN/RECOMMENDATIONS:   Plan for treatment: therapy treatment to continue next visit.  Planned interventions for next visit: continue with current treatment.

## 2021-09-21 ENCOUNTER — PHYSICAL THERAPY (OUTPATIENT)
Dept: PHYSICAL THERAPY | Facility: REHABILITATION | Age: 41
End: 2021-09-21
Attending: ORTHOPAEDIC SURGERY
Payer: COMMERCIAL

## 2021-09-21 DIAGNOSIS — S83.232A COMPLEX TEAR OF MEDIAL MENISCUS OF LEFT KNEE AS CURRENT INJURY, INITIAL ENCOUNTER: ICD-10-CM

## 2021-09-21 PROCEDURE — 97140 MANUAL THERAPY 1/> REGIONS: CPT

## 2021-09-21 PROCEDURE — 97110 THERAPEUTIC EXERCISES: CPT

## 2021-09-21 NOTE — OP THERAPY DAILY TREATMENT
"  Outpatient Physical Therapy  DAILY TREATMENT     [facility] Outpatient Physical Therapy [place] [address] [facility], [address]  [place] [address]  Phone:  [phone]    Date: 09/21/2021    Patient: Estrella Do  YOB: 1980  MRN: [id number]     Time Calculation    Start time: 0930  Stop time: 1015 Time Calculation (min): 45 minutes         Chief Complaint: No chief complaint on file.    Visit #: 9    SUBJECTIVE:  Tape is really helping, >75% decreased popping when tape is applied    OBJECTIVE:  Current objective measures: tightness right quad-knee flexion 0-120 prone          Therapeutic Exercises (CPT 11746):     1. Bike 5 min    2. SLR abduction and adduction on mat no resistance, x 20 each    3. TKE black tube single leg, x 20 b    4. Shuttle 3 cords SL, 2 x 30    5. Shuttle 4 cords with isometric hip adduction, 2 x 30    6. Wall sit 5 min with 7lb ball    7. Step downs 6 inch step, 2 x 15    8. Step up with contralateral march 6 inch, 2 x 15    Therapeutic Treatments and Modalities:     1. Manual Therapy (CPT 65527), IASTM left patellar tendon, PF joint mobs medial glide/superior tilt/, taping PF joint left medial glide /superior tilt , decrease//better  improved quad activation and terminal knee extension with gait, decreased patient  anxiety of left knee \"popping\"    Time-based treatments/modalities:    Physical Therapy Timed Treatment Charges  Manual therapy minutes (CPT 89085): 15 minutes  Therapeutic exercise minutes (CPT 71224): 30 minutes      ASSESSMENT:   Response to treatment: no popping episodes and normalized extensor mechanism and TKE with functional exercise and gait with medial superior PF glide using leukotape.  Progress quad strength/VMO and single leg loading next session    PLAN/RECOMMENDATIONS:   Plan for treatment: therapy treatment to continue next visit.  Planned interventions for next visit: continue with current treatment.       "

## 2021-09-28 ENCOUNTER — PHYSICAL THERAPY (OUTPATIENT)
Dept: PHYSICAL THERAPY | Facility: REHABILITATION | Age: 41
End: 2021-09-28
Attending: ORTHOPAEDIC SURGERY
Payer: COMMERCIAL

## 2021-09-28 DIAGNOSIS — S83.232A COMPLEX TEAR OF MEDIAL MENISCUS OF LEFT KNEE AS CURRENT INJURY, INITIAL ENCOUNTER: ICD-10-CM

## 2021-09-28 PROCEDURE — 97110 THERAPEUTIC EXERCISES: CPT

## 2021-09-28 PROCEDURE — 97112 NEUROMUSCULAR REEDUCATION: CPT

## 2021-09-28 NOTE — OP THERAPY DAILY TREATMENT
"  Outpatient Physical Therapy  DAILY TREATMENT     [facility] Outpatient Physical Therapy [address] [facility], [address]  [place] [address]  Phone:  [phone]    Date: 09/28/2021    Patient: Estrella Do  YOB: 1980  MRN: [id number]     Time Calculation    Start time: 1015  Stop time: 1100 Time Calculation (min): 45 minutes         Chief Complaint: No chief complaint on file.    Visit #: 10    SUBJECTIVE:  Increased popping with terminal knee extension and descending stairs. PF Taping works most of the time but did have increased infrapatellar swelling and pain with tape last week and had to remove it.        OBJECTIVE:  Current objective measures: consistent painful popping infrapatellar region with extension left knee without medial glide/superior tilt taping.  Taping removes most symptoms with single leg squat, step downs.  Suspect internal derangement.           Therapeutic Exercises (CPT 78533):     1. Bike 5 min    2. SLR abduction and adduction on mat no resistance, x 20 each, review    4. Shuttle 3 cords SL, 2 x 30    5. Shuttle 5cords DL, 2 x 50    6. Wall sit 5 min with 7lb ball    7. Step downs 6 inch step, 2 x 15    8. Step up with contralateral march 6 inch, 2 x 15    9. SLR standing hip extension, flexion and abduction grey band, 2 x 20    Therapeutic Treatments and Modalities:     1. Manual Therapy (CPT 38416), IASTM left patellar tendon, PF joint mobs medial glide/superior tilt/, taping PF joint left medial glide /superior tilt , decrease//better  improved quad activation and terminal knee extension with gait, decreased patient  anxiety of left knee \"popping\"    Time-based treatments/modalities:    Physical Therapy Timed Treatment Charges  Neuromusc re-ed, balance, coor, post minutes (CPT 15656): 8 minutes  Therapeutic exercise minutes (CPT 71277): 30 minutes    ASSESSMENT:   Response to treatment: refer back to MD for additional evaluiation of popping symptoms.  "     PLAN/RECOMMENDATIONS:   Plan for treatment: therapy treatment to continue next visit.  Planned interventions for next visit: continue with current treatment.

## 2021-10-05 ENCOUNTER — PHYSICAL THERAPY (OUTPATIENT)
Dept: PHYSICAL THERAPY | Facility: REHABILITATION | Age: 41
End: 2021-10-05
Attending: ORTHOPAEDIC SURGERY
Payer: COMMERCIAL

## 2021-10-05 DIAGNOSIS — S83.232A COMPLEX TEAR OF MEDIAL MENISCUS OF LEFT KNEE AS CURRENT INJURY, INITIAL ENCOUNTER: ICD-10-CM

## 2021-10-05 PROCEDURE — 97110 THERAPEUTIC EXERCISES: CPT

## 2021-10-05 NOTE — OP THERAPY DAILY TREATMENT
"  Outpatient Physical Therapy  DAILY TREATMENT     [facility] Outpatient Physical Therapy [place] [address] [facility], [address]  [place] [address]  Phone:  [phone]    Date: 10/05/2021    Patient: Estrella Do  YOB: 1980  MRN: [id number]     Time Calculation    Start time: 1300  Stop time: 1345 Time Calculation (min): 45 minutes         Chief Complaint: No chief complaint on file.    Visit #: 11    SUBJECTIVE:  Went to DR. Christensen, given a PF stability brace and MRI ordered.  Patient is very frustrated with increased popping and pain left medial and anterior/infrapatellar region      OBJECTIVE:  Current objective measures:           Therapeutic Exercises (CPT 85064):     1. Bike 5 min    2. SLR abduction and adduction on mat no resistance, x 20 each    3. TKE black tube single leg, x 20    4. Shuttle 4cords SL left, x 20    5. Shuttle 4 cords x 15    6. Bosu step ups with contralateral march, 3 x 10    7. Horse kicks black tband, 3 x 10 black/purple    8. RDLS 5lb weight, single leg 3 x 10    9. Single leg bridge on ball, 2 x 10     Therapeutic Treatments and Modalities:     1. Manual Therapy (CPT 76714), IASTM left patellar tendon, PF joint mobs medial glide/superior tilt/, taping PF joint left medial glide /superior tilt , decrease//better  improved quad activation and terminal knee extension with gait, decreased patient  anxiety of left knee \"popping\"    Time-based treatments/modalities:    Physical Therapy Timed Treatment Charges  Therapeutic exercise minutes (CPT 21799): 40 minutes    ASSESSMENT:   Response to treatment: no popping during session, improved confidence with single leg loading    PLAN/RECOMMENDATIONS:   Plan for treatment: therapy treatment to continue next visit.  Planned interventions for next visit: continue with current treatment.       "

## 2021-10-11 ENCOUNTER — PHYSICAL THERAPY (OUTPATIENT)
Dept: PHYSICAL THERAPY | Facility: REHABILITATION | Age: 41
End: 2021-10-11
Attending: ORTHOPAEDIC SURGERY
Payer: COMMERCIAL

## 2021-10-11 DIAGNOSIS — S83.232A COMPLEX TEAR OF MEDIAL MENISCUS OF LEFT KNEE AS CURRENT INJURY, INITIAL ENCOUNTER: ICD-10-CM

## 2021-10-11 PROCEDURE — 97140 MANUAL THERAPY 1/> REGIONS: CPT

## 2021-10-11 PROCEDURE — 97110 THERAPEUTIC EXERCISES: CPT

## 2021-10-11 NOTE — OP THERAPY DAILY TREATMENT
Outpatient Physical Therapy  DAILY TREATMENT     [facility] Outpatient Physical Therapy [place] [address] [facility], [address]  [place] [address]  Phone:  [phone]    Date: 10/11/2021    Patient: Estrella Do  YOB: 1980  MRN: [id number]     Time Calculation    Start time: 1015  Stop time: 1100 Time Calculation (min): 45 minutes         Chief Complaint: No chief complaint on file.    Visit #: 12    SUBJECTIVE:  Knee is still popping but less painful than it was prior to brace    OBJECTIVE:  Current objective measures:           Therapeutic Exercises (CPT 46958):     1. Bike 5 min    2. SLR 3 x 10    4. Shuttle 5cords DL/ SL left, x 20    5. Shuttle 4 cords x 15 heel raises SL, x20    6. Bosu step ups with contralateral march, 3 x 10    8. RDLS 5lb weight, single leg 3 x 10    9. Single leg bridge on ball, 2 x 10     10. Split walking  lunges , x 20     11. Lateral lunge, x 20     Therapeutic Treatments and Modalities:     1. Manual Therapy (CPT 59985), Patellar tendon STM; P-F joint mob     Time-based treatments/modalities:    Physical Therapy Timed Treatment Charges  Manual therapy minutes (CPT 46004): 10 minutes  Therapeutic exercise minutes (CPT 43642): 30 minutes    ASSESSMENT:   Response to treatment: no significant episodes of popping or pain during increased loading while wearing PF stabilization brace; knee actually felt better with increased load    PLAN/RECOMMENDATIONS:   Plan for treatment: therapy treatment to continue next visit.  Planned interventions for next visit: continue with current treatment.

## 2021-10-26 ENCOUNTER — APPOINTMENT (OUTPATIENT)
Dept: PHYSICAL THERAPY | Facility: REHABILITATION | Age: 41
End: 2021-10-26
Attending: ORTHOPAEDIC SURGERY
Payer: COMMERCIAL

## 2021-10-27 ENCOUNTER — PHYSICAL THERAPY (OUTPATIENT)
Dept: PHYSICAL THERAPY | Facility: REHABILITATION | Age: 41
End: 2021-10-27
Attending: ORTHOPAEDIC SURGERY
Payer: COMMERCIAL

## 2021-10-27 DIAGNOSIS — S83.232A COMPLEX TEAR OF MEDIAL MENISCUS OF LEFT KNEE AS CURRENT INJURY, INITIAL ENCOUNTER: ICD-10-CM

## 2021-10-27 PROCEDURE — 97110 THERAPEUTIC EXERCISES: CPT

## 2021-10-27 PROCEDURE — 97140 MANUAL THERAPY 1/> REGIONS: CPT

## 2021-10-27 NOTE — OP THERAPY DAILY TREATMENT
Outpatient Physical Therapy  DAILY TREATMENT     [facility] Outpatient Physical Therapy [place] [address] [facility], [address]  [place] [address]  Phone:  [phone]    Date: 10/27/2021    Patient: Estrella Do  YOB: 1980  MRN: [id number]     Time Calculation    Start time: 0936  Stop time: 1015 Time Calculation (min): 39 minutes         Chief Complaint: No chief complaint on file.    Visit #: 13    SUBJECTIVE:  Increasing knee pain with flexion > 90 degrees and with end range extension    OBJECTIVE:  Current objective measures: patellar tendon and medial tibiofemoral joint pain           Therapeutic Exercises (CPT 55036):     1. Bike 5 min    2. SLR 2 x 15 supine and sidelying left LE, 2 x 15    4. Shuttle 4 cords, 5 min    5. Shuttle 4 cords x 15 heel raises, x20    8. RDLS 5lb weight, single leg 3 x 10    9. Single leg bridge on ball, 2 x 10     11. Lateral lunge, x 20     Therapeutic Treatments and Modalities:     1. Manual Therapy (CPT 57767), Patellar tendon STM; P-F joint mob     Time-based treatments/modalities:    Physical Therapy Timed Treatment Charges  Manual therapy minutes (CPT 26458): 8 minutes  Therapeutic exercise minutes (CPT 02311): 30 minutes      ASSESSMENT:   Response to treatment: patient very apprehensive with TKE unloaded and loaded and with loaded flexion > 90 degrees.  Patient feels a shift inside the knee and popping with pain.  Recommend referral back to MD/MRI prior to more follow up appts.     PLAN/RECOMMENDATIONS:   Plan for treatment: therapy treatment to continue next visit.  Planned interventions for next visit: continue with current treatment.

## 2022-10-05 ENCOUNTER — HOSPITAL ENCOUNTER (OUTPATIENT)
Facility: MEDICAL CENTER | Age: 42
End: 2022-10-05
Attending: FAMILY MEDICINE
Payer: COMMERCIAL

## 2022-10-05 ENCOUNTER — OFFICE VISIT (OUTPATIENT)
Dept: URGENT CARE | Facility: CLINIC | Age: 42
End: 2022-10-05
Payer: COMMERCIAL

## 2022-10-05 VITALS
SYSTOLIC BLOOD PRESSURE: 118 MMHG | RESPIRATION RATE: 18 BRPM | BODY MASS INDEX: 26.08 KG/M2 | DIASTOLIC BLOOD PRESSURE: 60 MMHG | TEMPERATURE: 98.6 F | HEIGHT: 63 IN | WEIGHT: 147.2 LBS | OXYGEN SATURATION: 99 % | HEART RATE: 66 BPM

## 2022-10-05 DIAGNOSIS — R39.9 UTI SYMPTOMS: ICD-10-CM

## 2022-10-05 DIAGNOSIS — N94.9 VAGINAL BURNING: ICD-10-CM

## 2022-10-05 LAB
APPEARANCE UR: CLEAR
BILIRUB UR STRIP-MCNC: NEGATIVE MG/DL
COLOR UR AUTO: YELLOW
GLUCOSE UR STRIP.AUTO-MCNC: NEGATIVE MG/DL
INT CON NEG: NEGATIVE
INT CON POS: POSITIVE
KETONES UR STRIP.AUTO-MCNC: NEGATIVE MG/DL
LEUKOCYTE ESTERASE UR QL STRIP.AUTO: NORMAL
NITRITE UR QL STRIP.AUTO: NEGATIVE
PH UR STRIP.AUTO: 7 [PH] (ref 5–8)
POC URINE PREGNANCY TEST: NEGATIVE
PROT UR QL STRIP: NEGATIVE MG/DL
RBC UR QL AUTO: NEGATIVE
SP GR UR STRIP.AUTO: 1.02
UROBILINOGEN UR STRIP-MCNC: 0.2 MG/DL

## 2022-10-05 PROCEDURE — 87660 TRICHOMONAS VAGIN DIR PROBE: CPT

## 2022-10-05 PROCEDURE — 99203 OFFICE O/P NEW LOW 30 MIN: CPT | Performed by: FAMILY MEDICINE

## 2022-10-05 PROCEDURE — 87086 URINE CULTURE/COLONY COUNT: CPT

## 2022-10-05 PROCEDURE — 87510 GARDNER VAG DNA DIR PROBE: CPT

## 2022-10-05 PROCEDURE — 81025 URINE PREGNANCY TEST: CPT | Performed by: FAMILY MEDICINE

## 2022-10-05 PROCEDURE — 81002 URINALYSIS NONAUTO W/O SCOPE: CPT | Performed by: FAMILY MEDICINE

## 2022-10-05 PROCEDURE — 87480 CANDIDA DNA DIR PROBE: CPT

## 2022-10-05 ASSESSMENT — FIBROSIS 4 INDEX: FIB4 SCORE: 0.44

## 2022-10-05 NOTE — PROGRESS NOTES
"  Subjective:      42 y.o. female presents to urgent care for concerns of a bladder infection.  Since last week she has been experiencing increased urinary urgency, frequency, and dysuria.  No associated hematuria.  She also notes feeling a burning and swelling sensation in her vagina.  Bowel movements are regular and soft.  She drinks an average of 2L of water and 1 caffeinated beverages.  She is currently sexually active with one, male partner and they use OCP for contraception.    She denies any other questions or concerns at this time.    Current problem list, medication, and past medical/surgical history were reviewed in Epic.    ROS  See HPI     Objective:      /60 (BP Location: Left arm, Patient Position: Sitting)   Pulse 66   Temp 37 °C (98.6 °F) (Temporal)   Resp 18   Ht 1.6 m (5' 3\")   Wt 66.8 kg (147 lb 3.2 oz)   LMP  (Exact Date)   SpO2 99%   BMI 26.08 kg/m²     Physical Exam  Constitutional:       General: She is not in acute distress.     Appearance: She is not diaphoretic.   Cardiovascular:      Rate and Rhythm: Normal rate and regular rhythm.      Heart sounds: Normal heart sounds.   Pulmonary:      Effort: Pulmonary effort is normal. No respiratory distress.      Breath sounds: Normal breath sounds.   Abdominal:      General: Bowel sounds are normal.      Palpations: Abdomen is soft.      Tenderness: There is no abdominal tenderness. There is no right CVA tenderness or left CVA tenderness.   Neurological:      Mental Status: She is alert.   Psychiatric:         Mood and Affect: Affect normal.         Judgment: Judgment normal.     Assessment/Plan:     1. UTI symptoms  2. Vaginal burning  hCG negative.  Trace leukocytes on urinalysis, will send urine culture for confirmation.  DNA vaginal pathogen has also been sent.  We will treat appropriately once resulted.  - POCT Pregnancy  - POCT Urinalysis  - URINE CULTURE(NEW); Future  - VAGINAL PATHOGENS DNA PANEL; Future      Instructed to " return to Urgent Care or nearest Emergency Department if symptoms fail to improve, for any change in condition, further concerns, or new concerning symptoms. Patient states understanding of the plan of care and discharge instructions.    Ivonne Bañuelos M.D.

## 2022-10-06 DIAGNOSIS — R39.9 UTI SYMPTOMS: ICD-10-CM

## 2022-10-06 DIAGNOSIS — N94.9 VAGINAL BURNING: ICD-10-CM

## 2022-10-06 LAB
CANDIDA DNA VAG QL PROBE+SIG AMP: NEGATIVE
G VAGINALIS DNA VAG QL PROBE+SIG AMP: NEGATIVE
T VAGINALIS DNA VAG QL PROBE+SIG AMP: NEGATIVE

## 2022-10-09 LAB
BACTERIA UR CULT: NORMAL
SIGNIFICANT IND 70042: NORMAL
SITE SITE: NORMAL
SOURCE SOURCE: NORMAL

## 2023-03-06 ENCOUNTER — TELEPHONE (OUTPATIENT)
Dept: PHYSICAL THERAPY | Facility: REHABILITATION | Age: 43
End: 2023-03-06
Payer: COMMERCIAL

## 2023-03-06 DIAGNOSIS — S83.232A COMPLEX TEAR OF MEDIAL MENISCUS OF LEFT KNEE AS CURRENT INJURY, INITIAL ENCOUNTER: ICD-10-CM

## 2023-03-21 NOTE — OP THERAPY DISCHARGE SUMMARY
Outpatient Physical Therapy  DISCHARGE SUMMARY NOTE      [facility] [address], [address]  [place] [address]  Phone:  [phone]    Date of Visit: 03/06/2023    Patient: Estrella Do  YOB: 1980  MRN: [id number]     Referring Provider: Rick Christensen M.D.   Referring Diagnosis  Complex tear of medial meniscus of left knee as current injury, initial             Functional Assessment Used        Your patient is being discharged from Physical Therapy with the following comments:   Goals not met  Patient has failed to schedule or reschedule follow-up visits    Comments:  Please see daily treatment notes for details     Limitations Remaining:      Recommendations:  Discharge from PT    Nely Pastor PT, MSPT    Date: 3/6/2023          Include Pregnancy/Lactation Warning?: No

## 2023-04-07 ENCOUNTER — APPOINTMENT (OUTPATIENT)
Dept: RADIOLOGY | Facility: MEDICAL CENTER | Age: 43
End: 2023-04-07
Attending: FAMILY MEDICINE
Payer: COMMERCIAL

## 2023-04-07 DIAGNOSIS — R40.4 ALTERED LEVEL OF CONSCIOUSNESS: ICD-10-CM

## 2023-04-07 DIAGNOSIS — R51.9 NONINTRACTABLE HEADACHE, UNSPECIFIED CHRONICITY PATTERN, UNSPECIFIED HEADACHE TYPE: ICD-10-CM

## 2023-04-07 PROCEDURE — 70551 MRI BRAIN STEM W/O DYE: CPT

## 2023-05-03 NOTE — OP THERAPY DAILY TREATMENT
"  Outpatient Physical Therapy  DAILY TREATMENT     [facility] Outpatient Physical Therapy [address], [address]  [place] [address]  Phone:  [phone]    Date: 10/05/2021    Patient: Estrella Do  YOB: 1980  MRN: [id number]     Time Calculation                   Chief Complaint: No chief complaint on file.    Visit #: 11    SUBJECTIVE:  ***    OBJECTIVE:  Current objective measures: ***          Therapeutic Exercises (CPT 89690):     1. Bike 5 min    2. SLR abduction and adduction on mat no resistance, x 20 each, review    4. Shuttle 3 cords SL, 2 x 30    5. Shuttle 5cords DL, 2 x 50    6. Wall sit 5 min with 7lb ball    7. Step downs 6 inch step, 2 x 15    8. Step up with contralateral march 6 inch, 2 x 15    9. SLR standing hip extension, flexion and abduction grey band, 2 x 20    Therapeutic Treatments and Modalities:     1. Manual Therapy (CPT 08549), IASTM left patellar tendon, PF joint mobs medial glide/superior tilt/, taping PF joint left medial glide /superior tilt , decrease//better  improved quad activation and terminal knee extension with gait, decreased patient  anxiety of left knee \"popping\"    Time-based treatments/modalities:           Pain rating (1-10) before treatment:  {PAIN NUMBERS_1-10:49606}  Pain rating (1-10) after treatment:  {PAIN NUMBERS_1-10:73161}    ASSESSMENT:   Response to treatment: ***    PLAN/RECOMMENDATIONS:   Plan for treatment: {AMB OP THERAPY - THERAPY PLAN:885068275::\"therapy treatment to continue next visit\"}.  Planned interventions for next visit: {PT PLANNED THERAPY INTERVENTIONS:988621193::\"continue with current treatment\"}.       " I spoke with the patient to see where the rx needed to go and he stated that it needed to go to [facility] rx   I informed the patient that the PA was approved and that it came from [address] [place] [address] rx and asked the patient when the last time was that he spoke with [facility] rx and he stated it had been months ago.  I asked if he had spoken with since we received the approval on 04.14.2023 and he stated that he had not     I suggested that he reach out to [facility] rx     Patient has been informed and voiced understanding

## 2023-06-08 ENCOUNTER — TELEPHONE (OUTPATIENT)
Dept: NEUROLOGY | Facility: MEDICAL CENTER | Age: 43
End: 2023-06-08
Payer: COMMERCIAL

## 2023-06-08 NOTE — TELEPHONE ENCOUNTER
NEUROLOGY PATIENT PRE-VISIT PLANNING     Patient was NOT contacted to complete PVP.  Note: Patient will not be contacted if there is no indication to call.     Patient Appointment is scheduled as: New Patient     Is visit type and length scheduled correctly? yes    [employer] Patient is checked in Patient Demographics? yes    3.   Is referral attached to visit? Yes    4. Were records received from referring provider? Yes    4. Patient was NOT contacted to have someone accompany them to visit.     5. Is this appointment scheduled as a Hospital Follow-Up?  No    6. Does the patient require any pre procedure or post procedure follow up? No    7. If any orders were placed at last visit or intended to be done for this visit do we have Results/Consult Notes? No  Labs - Labs were not ordered at last office visit.  Imaging - Imaging was not ordered at last office visit.  Referrals - No referrals were ordered at last office visit.  Note: If patient appointment is for lab or imaging review and patient did not complete the studies, check with provider if OK to reschedule patient until completed.    8. If patient appointment is for Botox - is order pended for provider? N/A

## 2023-06-19 ENCOUNTER — OFFICE VISIT (OUTPATIENT)
Dept: NEUROLOGY | Facility: MEDICAL CENTER | Age: 43
End: 2023-06-19
Attending: PSYCHIATRY & NEUROLOGY
Payer: COMMERCIAL

## 2023-06-19 VITALS
OXYGEN SATURATION: 97 % | TEMPERATURE: 98.7 F | SYSTOLIC BLOOD PRESSURE: 116 MMHG | DIASTOLIC BLOOD PRESSURE: 80 MMHG | HEIGHT: 63 IN | WEIGHT: 149.47 LBS | BODY MASS INDEX: 26.48 KG/M2 | HEART RATE: 83 BPM

## 2023-06-19 DIAGNOSIS — R25.1 TREMOR: ICD-10-CM

## 2023-06-19 DIAGNOSIS — R29.898 WEAKNESS OF BOTH LOWER EXTREMITIES: ICD-10-CM

## 2023-06-19 DIAGNOSIS — R20.2 PARESTHESIAS: ICD-10-CM

## 2023-06-19 DIAGNOSIS — R56.9 SEIZURE (HCC): ICD-10-CM

## 2023-06-19 PROCEDURE — 3074F SYST BP LT 130 MM HG: CPT | Performed by: PSYCHIATRY & NEUROLOGY

## 2023-06-19 PROCEDURE — 3079F DIAST BP 80-89 MM HG: CPT | Performed by: PSYCHIATRY & NEUROLOGY

## 2023-06-19 PROCEDURE — 99211 OFF/OP EST MAY X REQ PHY/QHP: CPT | Performed by: PSYCHIATRY & NEUROLOGY

## 2023-06-19 PROCEDURE — 99205 OFFICE O/P NEW HI 60 MIN: CPT | Performed by: PSYCHIATRY & NEUROLOGY

## 2023-06-19 RX ORDER — FLUOXETINE HYDROCHLORIDE 20 MG/1
20 CAPSULE ORAL DAILY
COMMUNITY
End: 2023-10-05

## 2023-06-19 ASSESSMENT — PATIENT HEALTH QUESTIONNAIRE - PHQ9: CLINICAL INTERPRETATION OF PHQ2 SCORE: 0

## 2023-06-19 NOTE — ASSESSMENT & PLAN NOTE
Pt states she has numbness in her fingertips and toes.  Sometimes feels as if there are needles.  Patient does have some cervical issues.  She had a mildly abnormal MRI.  Of the brain the symptoms have been getting worse since March of this year.

## 2023-06-19 NOTE — ASSESSMENT & PLAN NOTE
Pt with a history of stereo typic events.  Sometimes felt like a palpitation but there are accompanied by some altered sensorium and needing to lay down.  Patient did not feel like she was passing out.  She has had a completely negative cardiac work-up.

## 2023-06-19 NOTE — ASSESSMENT & PLAN NOTE
Pt has increasing episodes of leg weakness.  States the weakness is started in March and her legs also feel very heavy.  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

## 2023-06-19 NOTE — ASSESSMENT & PLAN NOTE
Left side tremor started in March 2023. Pt also tremor in her legs.  Has a resting and action quality.

## 2023-06-21 NOTE — PROGRESS NOTES
Chief Complaint   Patient presents with    Establish Care     Shake, get hot, joint pain, muscle weakness       Problem List Items Addressed This Visit       Tremor     Left side tremor started in March 2023. Pt also tremor in her legs.  Has a resting and action quality.         Relevant Orders    MR-CERVICAL SPINE-WITH & W/O    MR-THORACIC SPINE-WITH & W/O    COPPER, SERUM    CERULOPLASMIN    SSA 52 AND 60 (RO)(SOHAIL) AB, IGG    SSB(LA)(SOHAIL)IGG AB    LYME WESTERN BLOT IGG + IGM    PORPHYRINS, TOTAL WITH REFLEX TO FRACTIONATION, PLASMA    Weakness of both lower extremities     Pt has increasing episodes of leg weakness.  States the weakness is started in March and her legs also feel very heavy.  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         Relevant Orders    MR-CERVICAL SPINE-WITH & W/O    MR-THORACIC SPINE-WITH & W/O    VITAMIN D,25 HYDROXY (DEFICIENCY)    VITAMIN B12    ACETYLCHOLINE RECEP BINDING AB    CREATINE KINASE    COPPER, SERUM    CERULOPLASMIN    SSA 52 AND 60 (RO)(SOHAIL) AB, IGG    SSB(LA)(SOHAIL)IGG AB    LYME WESTERN BLOT IGG + IGM    PORPHYRINS, TOTAL WITH REFLEX TO FRACTIONATION, PLASMA    Paresthesias     Pt states she has numbness in her fingertips and toes.  Sometimes feels as if there are needles.  Patient does have some cervical issues.  She had a mildly abnormal MRI.  Of the brain the symptoms have been getting worse since March of this year.         Relevant Orders    MR-CERVICAL SPINE-WITH & W/O    MR-THORACIC SPINE-WITH & W/O    VITAMIN D,25 HYDROXY (DEFICIENCY)    VITAMIN B12    SSA 52 AND 60 (RO)(SOHAIL) AB, IGG    SSB(LA)(SOHAIL)IGG AB    LYME WESTERN BLOT IGG + IGM    PORPHYRINS, TOTAL WITH REFLEX TO FRACTIONATION, PLASMA    Seizure (HCC)     Pt with a history of stereo typic events.  Sometimes felt like a palpitation but there are accompanied by some altered sensorium and needing to lay down.  Patient did  "not feel like she was passing out.  She has had a completely negative cardiac work-up.         Relevant Orders    Referral to Neurodiagnostics (EEG,EP,EMG/NCS/DBS)       History of present illness:  Ozzy Do 43 y.o. female presents today for constellation of neurologic symptoms concerning for underlying metabolic, inflammatory or neurodegenerative condition.    Past medical history:   Past Medical History:   Diagnosis Date    Asthma     Awareness under anesthesia     PONV (postoperative nausea and vomiting)     Psychiatric problem     Depression       Past surgical history:   Past Surgical History:   Procedure Laterality Date    PB KNEE SCOPE,DIAGNOSTIC Left 2021    Procedure: ARTHROSCOPY, KNEE;  Surgeon: Rick Christensen M.D.;  Location: SURGERY [facility];  Service: Orthopedics    PB KNEE SCOPE,MED/LAT MENISECTOMY Left 2021    Procedure: MENISCECTOMY, KNEE, MEDIAL;  Surgeon: Rick Christensen M.D.;  Location: SURGERY [facility];  Service: Orthopedics    OTHER ORTHOPEDIC SURGERY Right 1997    Shoulder    OTHER      Tooth extraction X1       Family history:   Family History   Problem Relation Age of Onset    Cancer Paternal Grandmother     Heart Disease Father     Heart Failure Father        Social history:   Social History     Socioeconomic History    Marital status:      Spouse name: Not on file    Number of children: Not on file    Years of education: Not on file    Highest education level: Not on file   Occupational History    Not on file   Tobacco Use    Smoking status: Former     Types: Cigarettes     Start date:      Quit date: 2016     Years since quittin.4    Smokeless tobacco: Never    Tobacco comments:     \"On and off\"   Vaping Use    Vaping Use: Former    Substances: Nicotine   Substance and Sexual Activity    Alcohol use: Yes     Comment: occasional     Drug use: Yes     Types: Oral     Comment: THC    Sexual activity: Yes     Partners: Male     Birth " "control/protection: Pill   Other Topics Concern    Not on file   Social History Narrative    Not on file     Social Determinants of Health     Financial Resource Strain: Not on file   Food Insecurity: Not on file   Transportation Needs: Not on file   Physical Activity: Not on file   Stress: Not on file   Social Connections: Not on file   Intimate Partner Violence: Not on file   Housing Stability: Not on file       Current medications:   Current Outpatient Medications   Medication    FLUoxetine (PROZAC) 20 MG Cap    Bacillus Coagulans-Inulin (PROBIOTIC) 1-250 BILLION-MG Cap    cetirizine (ZYRTEC) 10 MG Tab    fluoxetine (PROZAC) 40 MG capsule    MAXWELL 1/20 1-20 MG-MCG per tablet     No current facility-administered medications for this visit.       Medication Allergy:  Allergies   Allergen Reactions    Tape Rash     \"Burns and skin peels off with bandaids and regular tape\" Does not know about paper tape.    Ceclor [Cefaclor]        Review of systems:   Constitutional: denies fever, night sweats, weight loss.   Eyes: denies acute vision change, eye pain or secretion.   Ears, Nose, Mouth, Throat: denies nasal secretion, nasal bleeding, difficulty swallowing, hearing loss, tinnitus, vertigo, ear pain, acute dental problems, oral ulcers or lesions.   Endocrine: denies recent weight changes, heat or cold intolerance, polyuria, polydypsia, polyphagia,abnormal hair growth.  Cardiovascular: denies new onset of chest pain, palpitations, syncope, or dyspnea of exertion.  Pulmonary: denies shortness of breath, new onset of cough, hemoptysis, wheezing, chest pain or flu-like symptoms.   GI: denies nausea, vomiting, diarrhea, GI bleeding, change in appetite, abdominal pain, and change in bowel habits.  : denies dysuria, urinary incontinence, hematuria.  Heme/oncology: denies history of easy bruising or bleeding. No history of cancer, DVTor PE.  Allergy/immunology: denies hives/urticaria, or itching.   Dermatologic: denies new " "rash, or new skin lesions.  Musculoskeletal:denies joint swelling or pain, muscle pain, neck and back pain. Neurologic: denies headaches, acute visual changes, facial droopiness, muscle weakness (focal or generalized), paresthesias, anesthesia, ataxia, change in speech or language, memory loss, abnormal movements, seizures, loss of consciousness, or episodes of confusion.   Psychiatric: denies symptoms of depression, anxiety, hallucinations, mood swings or changes, suicidal or homicidal thoughts.     Physical examination:   Vitals:    06/19/23 1424   BP: 116/80   Pulse: 83   Temp: 37.1 °C (98.7 °F)   TempSrc: Temporal   SpO2: 97%   Weight: 67.8 kg (149 lb 7.6 oz)   Height: 1.6 m (5' 3\")     General: Patient in no acute distress, pleasant and cooperative.  HEENT: Normocephalic, no signs of acute trauma.   Neck: supple, no meningeal signs or carotid bruits. There is normal range of motion. No tenderness on exam.   Chest: clear to auscultation. No cough.   CV: RRR, no murmurs.   Skin: no signs of acute rashes or trauma.   Musculoskeletal: joints exhibit full range of motion, without any pain to palpation. There are no signs of joint or muscle swelling. There is no tenderness to deep palpation of muscles.   Psychiatric: No hallucinatory behavior. Denies symptoms of depression or suicidal ideation. Mood and affect appear normal on exam.     NEUROLOGICAL EXAM:   Mental status, orientation: Awake, alert and fully oriented.   Speech and language: speech is clear and fluent. The patient is able to name, repeat and comprehend.   Memory: There is intact recollection of recent and remote events.   Cranial nerve exam: Pupils are 3-4 mm bilaterally and equally reactive to light and accommodation. Visual fields are intact by confrontation. Fundoscopic exam was unremarkable. There is no nystagmus on primary or secondary gaze. Intact full EOM in all directions of gaze. Face appears symmetric. Sensation in the face is intact to light " touch. Uvula is midline. Palate elevates symmetrically. Tongue is midline and without any signs of tongue biting or fasciculations. Sternocleidomastoid muscles exhibit is normal strength bilaterally. Shoulder shrug is intact bilaterally.   Motor exam: Strength is 5/5 in right all extremities on her left. Tone is normal.  Left sided tremor abnormal movements were seen on exam.   Sensory exam reveals normal sense of light touch, proprioception, vibration and pinprick in all extremities.   Deep tendon reflexes:  2+ throughout. Plantar responses are flexor. There is no clonus.   Coordination: shows a normal finger-nose-finger. Normal rapidly alternating movements.   Gait: The patient was able to get up from seated position on first attempt without requiring assistance. Found to be steady when walking. Movements were fluid with normal arm swing. The patient was able to turn without difficulties or tendency to fall. Romberg examination positive      ANCILLARY DATA REVIEWED:     Lab Data Review:  No results found for this or any previous visit (from the past 24 hour(s)).    Records reviewed: I reviewed all previous records      Imaging: I reviewed last neuroimaging consistent with some white matter abnormalities in the frontal region of her brain.          ASSESSMENT AND PLAN:    1. Tremor  Plan at this time is to evaluate for cause of tremor with labs and MRI scans  - MR-CERVICAL SPINE-WITH & W/O; Future  - MR-THORACIC SPINE-WITH & W/O; Future  - COPPER, SERUM; Future  - CERULOPLASMIN; Future  - SSA 52 AND 60 (RO)(SOHAIL) AB, IGG; Future  - SSB(LA)(SOHAIL)IGG AB; Future  - LYME WESTERN BLOT IGG + IGM; Future  - PORPHYRINS, TOTAL WITH REFLEX TO FRACTIONATION, PLASMA; Future    2. Weakness of both lower extremities  Plan to evaluate for inflammatory or autoimmune condition.  - MR-CERVICAL SPINE-WITH & W/O; Future  - MR-THORACIC SPINE-WITH & W/O; Future  - VITAMIN D,25 HYDROXY (DEFICIENCY); Future  - VITAMIN B12; Future  -  ACETYLCHOLINE RECEP BINDING AB; Future  - CREATINE KINASE; Future  - COPPER, SERUM; Future  - CERULOPLASMIN; Future  - SSA 52 AND 60 (RO)(SOHAIL) AB, IGG; Future  - SSB(LA)(SOHAIL)IGG AB; Future  - LYME WESTERN BLOT IGG + IGM; Future  - PORPHYRINS, TOTAL WITH REFLEX TO FRACTIONATION, PLASMA; Future    3. Paresthesias  Plan to evaluate for structural or autoimmune condition.  - MR-CERVICAL SPINE-WITH & W/O; Future  - MR-THORACIC SPINE-WITH & W/O; Future  - VITAMIN D,25 HYDROXY (DEFICIENCY); Future  - VITAMIN B12; Future  - SSA 52 AND 60 (RO)(SOHAIL) AB, IGG; Future  - SSB(LA)(SOHAIL)IGG AB; Future  - LYME WESTERN BLOT IGG + IGM; Future  - PORPHYRINS, TOTAL WITH REFLEX TO FRACTIONATION, PLASMA; Future    4. Seizure (HCC)  Plan to evaluate for cause of stereotypic events that could be seizure activity.  - Referral to Neurodiagnostics (EEG,EP,EMG/NCS/DBS)        FOLLOW-UP:   No follow-ups on file.      My total time spent caring for the patient on the day of the encounter was 66 minutes.   This does not include time spent on separately billable procedures/tests.     EDUCATION AND COUNSELING:  -Discussed regular exercise program and prevention of cardiovascular disease, including stroke.   -Discussed healthy lifestyle, including: healthy diet (rich in fruits, vegetables, nuts and healthy oils); proper hydration, and adequate sleep hygiene (allowing 7-8 hrs of overnight sleep).        Melissa Bloch, MD  Clinical  of Neurology [facility].   Diplomate in Neurology.   Office: [phone]  Fax: [phone]

## 2023-06-27 ENCOUNTER — HOSPITAL ENCOUNTER (OUTPATIENT)
Dept: LAB | Facility: MEDICAL CENTER | Age: 43
End: 2023-06-27
Attending: PSYCHIATRY & NEUROLOGY
Payer: COMMERCIAL

## 2023-06-27 DIAGNOSIS — R25.1 TREMOR: ICD-10-CM

## 2023-06-27 DIAGNOSIS — R29.898 WEAKNESS OF BOTH LOWER EXTREMITIES: ICD-10-CM

## 2023-06-27 DIAGNOSIS — R20.2 PARESTHESIAS: ICD-10-CM

## 2023-06-27 LAB
25(OH)D3 SERPL-MCNC: 36 NG/ML (ref 30–100)
CK SERPL-CCNC: 60 U/L (ref 0–154)
VIT B12 SERPL-MCNC: 705 PG/ML (ref 211–911)

## 2023-06-27 PROCEDURE — 86235 NUCLEAR ANTIGEN ANTIBODY: CPT | Mod: 91

## 2023-06-27 PROCEDURE — 36415 COLL VENOUS BLD VENIPUNCTURE: CPT

## 2023-06-27 PROCEDURE — 82550 ASSAY OF CK (CPK): CPT

## 2023-06-27 PROCEDURE — 82607 VITAMIN B-12: CPT

## 2023-06-27 PROCEDURE — 82306 VITAMIN D 25 HYDROXY: CPT

## 2023-06-27 PROCEDURE — 82390 ASSAY OF CERULOPLASMIN: CPT

## 2023-06-27 PROCEDURE — 84311 SPECTROPHOTOMETRY: CPT

## 2023-06-27 PROCEDURE — 82525 ASSAY OF COPPER: CPT

## 2023-06-27 PROCEDURE — 86617 LYME DISEASE ANTIBODY: CPT | Mod: 91

## 2023-06-27 PROCEDURE — 83519 RIA NONANTIBODY: CPT

## 2023-06-29 ENCOUNTER — HOSPITAL ENCOUNTER (OUTPATIENT)
Dept: RADIOLOGY | Facility: MEDICAL CENTER | Age: 43
End: 2023-06-29
Attending: PSYCHIATRY & NEUROLOGY
Payer: COMMERCIAL

## 2023-06-29 DIAGNOSIS — R25.1 TREMOR: ICD-10-CM

## 2023-06-29 DIAGNOSIS — R20.2 PARESTHESIAS: ICD-10-CM

## 2023-06-29 DIAGNOSIS — R29.898 WEAKNESS OF BOTH LOWER EXTREMITIES: ICD-10-CM

## 2023-06-29 LAB
ACHR BIND AB SER-SCNC: 0 NMOL/L (ref 0–0.4)
CERULOPLASMIN SERPL-MCNC: 40 MG/DL (ref 16–45)
COPPER SERPL-MCNC: 208.9 UG/DL (ref 80–155)
ENA SS-B IGG SER IA-ACNC: 0 AU/ML (ref 0–40)
SSA52 R0ENA AB IGG Q0420: 2 AU/ML (ref 0–40)
SSA60 R0ENA AB IGG Q0419: 10 AU/ML (ref 0–40)

## 2023-06-29 PROCEDURE — A9579 GAD-BASE MR CONTRAST NOS,1ML: HCPCS | Performed by: PSYCHIATRY & NEUROLOGY

## 2023-06-29 PROCEDURE — 72156 MRI NECK SPINE W/O & W/DYE: CPT

## 2023-06-29 PROCEDURE — 72157 MRI CHEST SPINE W/O & W/DYE: CPT

## 2023-06-29 PROCEDURE — 700117 HCHG RX CONTRAST REV CODE 255: Performed by: PSYCHIATRY & NEUROLOGY

## 2023-06-29 RX ADMIN — GADOTERIDOL 14 ML: 279.3 INJECTION, SOLUTION INTRAVENOUS at 16:28

## 2023-06-30 LAB
B BURGDOR IGG SER QL IB: NEGATIVE
B BURGDOR IGM SER QL IB: NEGATIVE

## 2023-07-03 LAB
PATH INTERP SPEC-IMP: NORMAL
PATH REV: NORMAL
PORPHYRINS SERPL-MCNC: <1 MCG/DL

## 2023-07-21 ENCOUNTER — TELEPHONE (OUTPATIENT)
Dept: NEUROLOGY | Facility: MEDICAL CENTER | Age: 43
End: 2023-07-21
Payer: COMMERCIAL

## 2023-07-21 NOTE — TELEPHONE ENCOUNTER
NEUROLOGY PATIENT PRE-VISIT PLANNING     Patient was not contacted to complete PVP.  Note: Patient will not be contacted if there is no indication to call.     Patient Appointment is scheduled as: Established Patient     Is visit type and length scheduled correctly? Yes    [employer] Patient is checked in Patient Demographics? Yes    3.   Is referral attached to visit? Yes    4. Were records received from referring provider? Yes    4. Patient was NOT contacted to have someone accompany them to visit.     5. Is this appointment scheduled as a Hospital Follow-Up?  No    6. Does the patient require any pre procedure or post procedure follow up? No    7. If any orders were placed at last visit or intended to be done for this visit do we have Results/Consult Notes? Yes  Labs - Labs were not ordered at last office visit.  Imaging - Imaging ordered, completed and results are in chart.  Referrals - No referrals were ordered at last office visit.  Note: If patient appointment is for lab or imaging review and patient did not complete the studies, check with provider if OK to reschedule patient until completed.    8. If patient appointment is for Botox - is order pended for provider? N/A

## 2023-08-01 ENCOUNTER — OFFICE VISIT (OUTPATIENT)
Dept: NEUROLOGY | Facility: MEDICAL CENTER | Age: 43
End: 2023-08-01
Attending: PSYCHIATRY & NEUROLOGY
Payer: COMMERCIAL

## 2023-08-01 VITALS
HEIGHT: 63 IN | DIASTOLIC BLOOD PRESSURE: 72 MMHG | SYSTOLIC BLOOD PRESSURE: 124 MMHG | HEART RATE: 80 BPM | OXYGEN SATURATION: 98 % | WEIGHT: 157.41 LBS | BODY MASS INDEX: 27.89 KG/M2 | TEMPERATURE: 98.2 F

## 2023-08-01 DIAGNOSIS — R20.2 PARESTHESIAS: ICD-10-CM

## 2023-08-01 DIAGNOSIS — M25.50 ARTHRALGIA, UNSPECIFIED JOINT: ICD-10-CM

## 2023-08-01 DIAGNOSIS — R29.898 WEAKNESS OF BOTH LOWER EXTREMITIES: ICD-10-CM

## 2023-08-01 DIAGNOSIS — R25.1 TREMOR: ICD-10-CM

## 2023-08-01 PROCEDURE — 99212 OFFICE O/P EST SF 10 MIN: CPT | Performed by: PSYCHIATRY & NEUROLOGY

## 2023-08-01 PROCEDURE — 3078F DIAST BP <80 MM HG: CPT | Performed by: PSYCHIATRY & NEUROLOGY

## 2023-08-01 PROCEDURE — 99215 OFFICE O/P EST HI 40 MIN: CPT | Performed by: PSYCHIATRY & NEUROLOGY

## 2023-08-01 PROCEDURE — 3074F SYST BP LT 130 MM HG: CPT | Performed by: PSYCHIATRY & NEUROLOGY

## 2023-08-01 RX ORDER — GABAPENTIN 100 MG/1
100 CAPSULE ORAL 3 TIMES DAILY
Qty: 90 CAPSULE | Refills: 5 | Status: SHIPPED | OUTPATIENT
Start: 2023-08-01 | End: 2023-09-26

## 2023-08-02 NOTE — PROGRESS NOTES
Chief Complaint   Patient presents with    Follow-Up     FV on last appointment/ test results/ pain management besides Advil        Problem List Items Addressed This Visit       Tremor     Pt with left tremor and some twitching in her abdomen.  Her tremor is better with a little bit of alcohol and her mother also does have a tremor so this may be familial.         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  She has not noticed any loss of muscle mass.  Her recent CPK was normal.  Patient also has joint pain.         Relevant Orders    Referral to Neurodiagnostics (EEG,EP,EMG/NCS/DBS)    Paresthesias     Patient has significant paresthesias legs.  Has continued rash.         Relevant Medications    gabapentin (NEURONTIN) 100 MG Cap    Other Relevant Orders    Referral to Neurodiagnostics (EEG,EP,EMG/NCS/DBS)       History of present illness:  Ozzy Do 43 y.o. 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  I reviewed patient's MRI scans and do not think this patient has multiple sclerosis.    Past medical history:   Past Medical History:   Diagnosis Date    Asthma     Awareness under anesthesia     PONV (postoperative nausea and vomiting)     Psychiatric problem     Depression       Past surgical history:   Past Surgical History:   Procedure Laterality Date    PB KNEE SCOPE,DIAGNOSTIC Left 7/28/2021    Procedure: ARTHROSCOPY, KNEE;  Surgeon: Rick Christensen M.D.;  Location: SURGERY [facility];  Service: Orthopedics    PB KNEE SCOPE,MED/LAT MENISECTOMY Left 7/28/2021    Procedure: MENISCECTOMY, KNEE, MEDIAL;  Surgeon: Rick Christensen M.D.;  Location: SURGERY [facility];  Service: Orthopedics    OTHER ORTHOPEDIC SURGERY Right 1997    Shoulder    OTHER      Tooth extraction X1       Family history:  "  Family History   Problem Relation Age of Onset    Cancer Paternal Grandmother     Heart Disease Father     Heart Failure Father        Social history:   Social History     Socioeconomic History    Marital status:      Spouse name: Not on file    Number of children: Not on file    Years of education: Not on file    Highest education level: Not on file   Occupational History    Not on file   Tobacco Use    Smoking status: Former     Types: Cigarettes     Start date:      Quit date: 2016     Years since quittin.5    Smokeless tobacco: Never    Tobacco comments:     \"On and off\"   Vaping Use    Vaping Use: Former    Substances: Nicotine   Substance and Sexual Activity    Alcohol use: Yes     Comment: occasional     Drug use: Yes     Types: Oral     Comment: THC    Sexual activity: Yes     Partners: Male     Birth control/protection: Pill   Other Topics Concern    Not on file   Social History Narrative    Not on file     Social Determinants of Health     Financial Resource Strain: Not on file   Food Insecurity: Not on file   Transportation Needs: Not on file   Physical Activity: Not on file   Stress: Not on file   Social Connections: Not on file   Intimate Partner Violence: Not on file   Housing Stability: Not on file       Current medications:   Current Outpatient Medications   Medication    gabapentin (NEURONTIN) 100 MG Cap    FLUoxetine (PROZAC) 20 MG Cap    Bacillus Coagulans-Inulin (PROBIOTIC) 1-250 BILLION-MG Cap    cetirizine (ZYRTEC) 10 MG Tab    fluoxetine (PROZAC) 40 MG capsule    MAXWELL  1-20 MG-MCG per tablet     No current facility-administered medications for this visit.       Medication Allergy:  Allergies   Allergen Reactions    Tape Rash     \"Burns and skin peels off with bandaids and regular tape\" Does not know about paper tape.    Ceclor [Cefaclor]        Review of systems:   Constitutional: denies fever, night sweats, weight loss.   Eyes: denies acute vision change, eye pain or " "secretion.   Ears, Nose, Mouth, Throat: denies nasal secretion, nasal bleeding, difficulty swallowing, hearing loss, tinnitus, vertigo, ear pain, acute dental problems, oral ulcers or lesions.   Endocrine: denies recent weight changes, heat or cold intolerance, polyuria, polydypsia, polyphagia,abnormal hair growth.  Cardiovascular: denies new onset of chest pain, palpitations, syncope, or dyspnea of exertion.  Pulmonary: denies shortness of breath, new onset of cough, hemoptysis, wheezing, chest pain or flu-like symptoms.   GI: denies nausea, vomiting, diarrhea, GI bleeding, change in appetite, abdominal pain, and change in bowel habits.  : denies dysuria, urinary incontinence, hematuria.  Heme/oncology: denies history of easy bruising or bleeding. No history of cancer, DVTor PE.  Allergy/immunology: denies hives/urticaria, or itching.   Dermatologic: denies new rash, or new skin lesions.  Musculoskeletal:denies joint swelling or pain, muscle pain, neck and back pain. Neurologic: denies headaches, acute visual changes, facial droopiness, muscle weakness (focal or generalized), paresthesias, anesthesia, ataxia, change in speech or language, memory loss, abnormal movements, seizures, loss of consciousness, or episodes of confusion.   Psychiatric: denies symptoms of depression, anxiety, hallucinations, mood swings or changes, suicidal or homicidal thoughts.     Physical examination:   Vitals:    08/01/23 1606   BP: 124/72   Pulse: 80   Temp: 36.8 °C (98.2 °F)   TempSrc: Temporal   SpO2: 98%   Weight: 71.4 kg (157 lb 6.5 oz)   Height: 1.6 m (5' 3\")     General: Patient in no acute distress, pleasant and cooperative.  HEENT: Normocephalic, no signs of acute trauma.   Neck: supple, no meningeal signs or carotid bruits. There is normal range of motion. No tenderness on exam.   Chest: clear to auscultation. No cough.   CV: RRR, no murmurs.   Skin: no signs of acute rashes or trauma.   Musculoskeletal: joints exhibit full " range of motion, without any pain to palpation. There are no signs of joint or muscle swelling. There is no tenderness to deep palpation of muscles.   Psychiatric: No hallucinatory behavior. Denies symptoms of depression or suicidal ideation. Mood and affect appear normal on exam.     NEUROLOGICAL EXAM:   Mental status, orientation: Awake, alert and fully oriented.   Speech and language: speech is clear and fluent. The patient is able to name, repeat and comprehend.   Memory: There is intact recollection of recent and remote events.   Cranial nerve exam: Pupils are 3-4 mm bilaterally and equally reactive to light and accommodation. Visual fields are intact by confrontation. Fundoscopic exam was unremarkable. There is no nystagmus on primary or secondary gaze. Intact full EOM in all directions of gaze. Face appears symmetric. Sensation in the face is intact to light touch. Uvula is midline. Palate elevates symmetrically. Tongue is midline and without any signs of tongue biting or fasciculations. Sternocleidomastoid muscles exhibit is normal strength bilaterally. Shoulder shrug is intact bilaterally.   Motor exam: Strength is 5/5 in all extremities. Tone is normal.  We will style tremor in hands worse left than right..   Sensory exam reveals normal sense of light touch, proprioception, vibration and pinprick in all extremities.   Deep tendon reflexes:  2+ throughout. Plantar responses are flexor. There is no clonus.   Coordination: shows a normal finger-nose-finger. Normal rapidly alternating movements.   Gait: The patient was able to get up from seated position on first attempt without requiring assistance. Found to be steady when walking. Movements were fluid with normal arm swing. The patient was able to turn without difficulties or tendency to fall. Romberg examination positive      ANCILLARY DATA REVIEWED:     Lab Data Review:  No results found for this or any previous visit (from the past 24 hour(s)).    Records  reviewed: Previous laboratory testing which showed negative TYLER.      Imaging: I reviewed neuroimaging of the brain cervical and thoracic cord which did not show any abnormalities.          ASSESSMENT AND PLAN:    1. Tremor  I suspect this is an essential tremor and at this point we will try a little bit of gabapentin to see if it helps.  - gabapentin (NEURONTIN) 100 MG Cap; Take 1 Capsule by mouth 3 times a day for 180 days.  Dispense: 90 Capsule; Refill: 5    2. Weakness of both lower extremities  I will order EMG nerve conduction study of her lower extremities to see if this can find any cause of her muscle issues.  - Referral to Neurodiagnostics (EEG,EP,EMG/NCS/DBS)    3. Paresthesias  I will see if patient is having any evidence of radiculopathy or neuropathy in her legs.  - Referral to Neurodiagnostics (EEG,EP,EMG/NCS/DBS)  - gabapentin (NEURONTIN) 100 MG Cap; Take 1 Capsule by mouth 3 times a day for 180 days.  Dispense: 90 Capsule; Refill: 5        FOLLOW-UP:   No follow-ups on file.    My total time spent caring for the patient on the day of the encounter was 57 minutes.   This does not include time spent on separately billable procedures/tests.     EDUCATION AND COUNSELING:  -Discussed regular exercise program and prevention of cardiovascular disease, including stroke.   -Discussed healthy lifestyle, including: healthy diet (rich in fruits, vegetables, nuts and healthy oils); proper hydration, and adequate sleep hygiene (allowing 7-8 hrs of overnight sleep).        Melissa Bloch, MD  Clinical  of Neurology [facility].   Diplomate in Neurology.   Office: [phone]  Fax: [phone]

## 2023-08-02 NOTE — ASSESSMENT & PLAN NOTE
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  She has not noticed any loss of muscle mass.  Her recent CPK was normal.  Patient also has joint pain.

## 2023-08-02 NOTE — ASSESSMENT & PLAN NOTE
Pt with left tremor and some twitching in her abdomen.  Her tremor is better with a little bit of alcohol and her mother also does have a tremor so this may be familial.

## 2023-08-31 ENCOUNTER — OFFICE VISIT (OUTPATIENT)
Dept: RHEUMATOLOGY | Facility: MEDICAL CENTER | Age: 43
End: 2023-08-31
Attending: INTERNAL MEDICINE
Payer: COMMERCIAL

## 2023-08-31 VITALS
HEART RATE: 82 BPM | WEIGHT: 159 LBS | HEIGHT: 63 IN | BODY MASS INDEX: 28.17 KG/M2 | OXYGEN SATURATION: 98 % | TEMPERATURE: 97.4 F | RESPIRATION RATE: 14 BRPM | DIASTOLIC BLOOD PRESSURE: 60 MMHG | SYSTOLIC BLOOD PRESSURE: 110 MMHG

## 2023-08-31 DIAGNOSIS — M25.50 POLYARTHRALGIA: ICD-10-CM

## 2023-08-31 DIAGNOSIS — R25.1 TREMORS OF NERVOUS SYSTEM: ICD-10-CM

## 2023-08-31 DIAGNOSIS — R29.898 WEAKNESS OF BOTH LOWER EXTREMITIES: ICD-10-CM

## 2023-08-31 DIAGNOSIS — R21 RASH: ICD-10-CM

## 2023-08-31 PROCEDURE — 99205 OFFICE O/P NEW HI 60 MIN: CPT | Performed by: INTERNAL MEDICINE

## 2023-08-31 PROCEDURE — 3074F SYST BP LT 130 MM HG: CPT | Performed by: INTERNAL MEDICINE

## 2023-08-31 PROCEDURE — 99211 OFF/OP EST MAY X REQ PHY/QHP: CPT | Performed by: INTERNAL MEDICINE

## 2023-08-31 PROCEDURE — 3078F DIAST BP <80 MM HG: CPT | Performed by: INTERNAL MEDICINE

## 2023-08-31 NOTE — ASSESSMENT & PLAN NOTE
Patient here for evaluation of pain in hands and wristd r over left, about one year ago, chronic low back pain,     Chronic mouth sores  Right little finger trigger finger  paient does advil, hands mostly  Thumb joint  Rash left neck and between breats 3/2023          PMHx  Asthma  Tremors, possible seizures    Fam Hx  M-RA, fibromyalgia, depression  F-CAD, HTN  S-thrombocytopenia,     Soc Hx  Pos tobacco, quit 2013  ETOH 4-5/week  No blood tx  Positive tattoos  No IVDA    SSA neg 6/2023  SSB neg 6/2023  Lyme neg 6/2023

## 2023-08-31 NOTE — PROGRESS NOTES
Chief Complaint-polyarthralgias      Chief Complaint   Patient presents with    New Patient     Ozzy Do is a 43 y.o. female here as a new Rheumatology Consult referred by Jackie Hansen M.D.    This is a new patient evaluation    HPI:     This is a new patient evaluation, patient referred for evaluation of constellation of symptoms including polyarthralgias, rash, and tremors.  Patient brain scan also found to have white matter changes, patient status post evaluation by neurology Dr. Arredondo who felt patient does not have multiple sclerosis but does not have a definitive diagnosis for patient's tremor and white matter changes in the brain found on MRI of brain April 2023.  Patient states that she feels her symptoms started about a year ago where she started getting pain in her hands and wrists with the right worse than left, symptoms would come and go but over the last year have been getting progressively more frequent.  Patient also complains of chronic low back pain, chronic mouth sores, complains of a right fifth finger trigger finger, also pain in her left thumb joint, patient states she also gets a rash in the intertriginous regions of her left neck and between her breasts starting about March 2023.  Patient denies any pain but sometimes the rash on her neck does burn a little bit denies any purulent discharge, states it does get raised once no while, sometimes flaky, no pruritus.  Rash spontaneously resolves.  Denies any Raynaud's phenomenon.  Patient does complain of oral ulcers intermittently.    Of note patient does have an EMG pending for her tremors and her perceived lower extremity weakness, patient also has a dermatology evaluation pending for possible biopsy of her rash.      PMHx  Asthma  Tremors, possible seizures    Fam Hx  M-RA, fibromyalgia, depression  F-CAD, HTN  S-thrombocytopenia,     Soc Hx  Pos tobacco, quit 2013  ETOH 4-5/week  No blood tx  Positive tattoos  No IVDA  Patient works  "as a hairdresser    Addendum 2023  dsDNA neg 2023  Bridges neg 2023  Scl-70 neh 2023  SSA neg 2023  SSB neg 2023  MEAGAN-1 neg 2023  Centromere neg 2023  CCP neg 2023    SSA neg 2023  SSB neg 2023  Lyme neg 2023      Addendum 3/18/2024  MRI Right Hand 3/2024-IMPRESSION:  1. No joint effusion or erosions seen.  2. No MR evidence of inflammatory arthropathy.      Current medicines (including changes today)  Current Outpatient Medications   Medication Sig Dispense Refill    FLUoxetine (PROZAC) 20 MG Cap Take 20 mg by mouth every day.      Bacillus Coagulans-Inulin (PROBIOTIC) 1-250 BILLION-MG Cap       cetirizine (ZYRTEC) 10 MG Tab Take 10 mg by mouth 1 time a day as needed for Allergies.      fluoxetine (PROZAC) 40 MG capsule Take 40 mg by mouth every day.      MAXWELL  1-20 MG-MCG per tablet Take 1 Tab by mouth every day.      gabapentin (NEURONTIN) 100 MG Cap Take 1 Capsule by mouth 3 times a day for 180 days. 90 Capsule 5     No current facility-administered medications for this visit.     She  has a past medical history of Asthma, Awareness under anesthesia, PONV (postoperative nausea and vomiting), and Psychiatric problem.  She  has a past surgical history that includes other orthopedic surgery (Right, ); other; pr knee scope,diagnostic (Left, 2021); and pr knee scope,med/lat menisectomy (Left, 2021).  Family History   Problem Relation Age of Onset    Cancer Paternal Grandmother     Heart Disease Father     Heart Failure Father      Family Status   Relation Name Status    PGMo  (Not Specified)    Zaina Jaime Alive, age 72y        HCM, CHF, AICD implanted      Social History     Tobacco Use    Smoking status: Former     Current packs/day: 0.00     Types: Cigarettes     Start date:      Quit date: 2016     Years since quittin.6    Smokeless tobacco: Never    Tobacco comments:     \"On and off\"   Vaping Use    Vaping Use: Former    Substances: Nicotine   Substance " "Use Topics    Alcohol use: Yes     Comment: occasional     Drug use: Yes     Types: Oral     Comment: THC     Social History     Social History Narrative    Not on file       ROS   Other than what is mentioned in HPI or physical exam, there is no history of headaches, double vision or blurred vision. No temporal tenderness or jaw claudication.  No trouble swallowing difficulties or sore throats.  No chest complaints including chest pain, cough or sputum production. No GI complaints including nausea, vomiting, change in bowel habits, or past peptic ulcer disease. No history of blood in the stools. No urinary complaints including dysuria or frequency. No history of alopecia, photosensitivity, oral ulcerations, Raynaud's phenomena.       Objective:     /60   Pulse 82   Temp 36.3 °C (97.4 °F) (Temporal)   Resp 14   Ht 1.6 m (5' 3\")   Wt 72.1 kg (159 lb)   SpO2 98%  Body mass index is 28.17 kg/m².  Physical Exam:    Constitutional: Alert and oriented X3, no distress.Skin: Warm, dry, good turgor, irregular shaped slightly raised erythematous rash on left side of neck also intertriginous regions between the breasts, patient brings in pictures of same kind of rash on the extensor aspects of her right forearm, no pustules no blisters nonpruritic  Eye: Equal, round and reactive, conjunctiva clear, lids normal EOM intactENMT: Lips without lesions, good dentition, no oropharyngeal ulcers, moist buccal mucosa, pinna without deformityNeck: Trachea midline, no masses, no thyromegaly.Lymph:  No cervical lymphadenopathy, no axillary lymphadenopathy, no supraclavicular lymphadenopathyRespiratory: Unlabored respiratory effort, lungs clear to auscultation, no wheezes, no ronchi.Cardiovascular: Normal S1, S2, Regular rate and rhythm, no murmurs rubs or gallops  Abdomen: Soft, non-tender, no masses, no hepatosplenomegaly, overweight.Psych: Alert and oriented x3, normal affect and mood.Neuro Cranial nerves 2-12 are grossly " intact, no loss of sensation LEExt:no joint laxity noted in bilateral arms, no joint laxity noted in bilateral legs, no erna sausage digits, no dactylitis, elbows without flexion contractures, shoulders full range of motion without limitations, toes without crossover toes and without splay toes, no enthesitis appreciated,    Lab Results   Component Value Date/Time    SODIUM 138 04/28/2021 03:00 PM    POTASSIUM 4.4 04/28/2021 03:00 PM    CHLORIDE 104 04/28/2021 03:00 PM    CO2 23 04/28/2021 03:00 PM    GLUCOSE 133 (H) 04/28/2021 03:00 PM    BUN 15 04/28/2021 03:00 PM    CREATININE 0.86 04/28/2021 03:00 PM      Lab Results   Component Value Date/Time    WBC 14.3 (H) 04/28/2021 01:25 PM    RBC 4.07 (L) 04/28/2021 01:25 PM    HEMOGLOBIN 13.6 04/28/2021 01:25 PM    HEMATOCRIT 40.0 04/28/2021 01:25 PM    MCV 98.3 (H) 04/28/2021 01:25 PM    MCH 33.4 (H) 04/28/2021 01:25 PM    MCHC 34.0 04/28/2021 01:25 PM    MPV 10.5 04/28/2021 01:25 PM    NEUTSPOLYS 85.60 (H) 04/28/2021 01:25 PM    LYMPHOCYTES 8.70 (L) 04/28/2021 01:25 PM    MONOCYTES 4.90 04/28/2021 01:25 PM    EOSINOPHILS 0.00 04/28/2021 01:25 PM    BASOPHILS 0.10 04/28/2021 01:25 PM      Lab Results   Component Value Date/Time    CALCIUM 9.8 04/28/2021 03:00 PM    ASTSGOT 15 04/28/2021 03:00 PM    ALTSGPT 18 04/28/2021 03:00 PM    ALKPHOSPHAT 64 04/28/2021 03:00 PM    TBILIRUBIN 0.2 04/28/2021 03:00 PM    ALBUMIN 4.7 04/28/2021 03:00 PM    TOTPROTEIN 7.7 04/28/2021 03:00 PM     Lab Results   Component Value Date/Time    ANTISSBSJ 0 06/27/2023 11:50 AM     Lab Results   Component Value Date/Time    SSA60 10 06/27/2023 11:50 AM    SSA52 2 06/27/2023 11:50 AM     Lab Results   Component Value Date/Time    CPKTOTAL 60 06/27/2023 11:50 AM     Lab Results   Component Value Date/Time    25HYDROXY 36 06/27/2023 11:50 AM     Lab Results   Component Value Date/Time    TSHULTRASEN 2.340 04/28/2021 01:25 PM     Results for orders placed during the hospital encounter of  06/29/23    MR-CERVICAL SPINE-WITH & W/O    Impression  Contrast enhanced MRI of the cervical spine within normal limits.    Assessment and Plan:  1. Polyarthralgia  Unclear etiology, today we will do a number of labs and x-rays for evaluation of autoimmune disorders, we will have patient back in about 2 weeks to review.  - DX-JOINT SURVEY-HANDS SINGLE VIEW; Future  - DX-JOINT SURVEY-FEET SINGLE VIEW; Future  - DX-KNEE 3 VIEWS Atraumatic Pain/Swelling/Deformity; Future  - CCP ANTIBODIES, IGG/IGA; Future  - URIC ACID, SERUM  - RHEUMATOID ARTHRITIS FACTOR; Future  - TYLER COMPREHENSIVE PANEL  - DSDNA AB, IGG W/RFLX TO IFA TITER; Future  - TSH; Future  - FREE THYROXINE; Future  - CBC WITH DIFFERENTIAL; Future  - Comp Metabolic Panel; Future  - Sed Rate; Future    2. Rash  Patient has an evaluation with dermatology September 2023 undergo biopsy for further evaluation  - TYLER COMPREHENSIVE PANEL  - DSDNA AB, IGG W/RFLX TO IFA TITER; Future  - TSH; Future  - FREE THYROXINE; Future  - CBC WITH DIFFERENTIAL; Future  - Comp Metabolic Panel; Future  - Sed Rate; Future    3. Weakness of both lower extremities  Patient has EMG of the lower extremities pending through Dr. Bloch in neurology  - CBC WITH DIFFERENTIAL; Future  - Comp Metabolic Panel; Future  - Sed Rate; Future  - CREATINE KINASE; Future  - ALDOLASE; Future    4. Tremors of nervous system  Patient currently follows with Dr. Bloch in neurology  - TSH; Future  - FREE THYROXINE; Future  - CBC WITH DIFFERENTIAL; Future  - Comp Metabolic Panel; Future  - Sed Rate; Future    Records requested.  Followup: Return in about 2 weeks (around 9/14/2023). or sooner prn  Patient was seen 60 minutes face-to-face (excluding time for procedures)  of which more than 50% the time was spent counseling the patient regarding  rheumatological conditions and care. Therapy was discussed in detail.  Thank you for this referral.    Please note that this dictation was created using voice  recognition software. I have made every reasonable attempt to correct obvious errors, but I expect that there are errors of grammar and possibly content that I did not discover before finalizing the note.

## 2023-09-05 ENCOUNTER — HOSPITAL ENCOUNTER (OUTPATIENT)
Dept: LAB | Facility: MEDICAL CENTER | Age: 43
End: 2023-09-05
Attending: INTERNAL MEDICINE
Payer: COMMERCIAL

## 2023-09-05 DIAGNOSIS — R25.1 TREMORS OF NERVOUS SYSTEM: ICD-10-CM

## 2023-09-05 DIAGNOSIS — R29.898 WEAKNESS OF BOTH LOWER EXTREMITIES: ICD-10-CM

## 2023-09-05 DIAGNOSIS — M25.50 POLYARTHRALGIA: ICD-10-CM

## 2023-09-05 DIAGNOSIS — R21 RASH: ICD-10-CM

## 2023-09-05 LAB
ALBUMIN SERPL BCP-MCNC: 4.5 G/DL (ref 3.2–4.9)
ALBUMIN/GLOB SERPL: 1.6 G/DL
ALP SERPL-CCNC: 53 U/L (ref 30–99)
ALT SERPL-CCNC: 14 U/L (ref 2–50)
ANION GAP SERPL CALC-SCNC: 11 MMOL/L (ref 7–16)
AST SERPL-CCNC: 15 U/L (ref 12–45)
BASOPHILS # BLD AUTO: 0.6 % (ref 0–1.8)
BASOPHILS # BLD: 0.04 K/UL (ref 0–0.12)
BILIRUB SERPL-MCNC: 0.3 MG/DL (ref 0.1–1.5)
BUN SERPL-MCNC: 15 MG/DL (ref 8–22)
CALCIUM ALBUM COR SERPL-MCNC: 8.7 MG/DL (ref 8.5–10.5)
CALCIUM SERPL-MCNC: 9.1 MG/DL (ref 8.5–10.5)
CHLORIDE SERPL-SCNC: 103 MMOL/L (ref 96–112)
CK SERPL-CCNC: 56 U/L (ref 0–154)
CO2 SERPL-SCNC: 22 MMOL/L (ref 20–33)
CREAT SERPL-MCNC: 0.92 MG/DL (ref 0.5–1.4)
EOSINOPHIL # BLD AUTO: 0.16 K/UL (ref 0–0.51)
EOSINOPHIL NFR BLD: 2.3 % (ref 0–6.9)
ERYTHROCYTE [DISTWIDTH] IN BLOOD BY AUTOMATED COUNT: 46.4 FL (ref 35.9–50)
GFR SERPLBLD CREATININE-BSD FMLA CKD-EPI: 80 ML/MIN/1.73 M 2
GLOBULIN SER CALC-MCNC: 2.9 G/DL (ref 1.9–3.5)
GLUCOSE SERPL-MCNC: 85 MG/DL (ref 65–99)
HCT VFR BLD AUTO: 40.4 % (ref 37–47)
HGB BLD-MCNC: 13.2 G/DL (ref 12–16)
IMM GRANULOCYTES # BLD AUTO: 0.02 K/UL (ref 0–0.11)
IMM GRANULOCYTES NFR BLD AUTO: 0.3 % (ref 0–0.9)
LYMPHOCYTES # BLD AUTO: 2.13 K/UL (ref 1–4.8)
LYMPHOCYTES NFR BLD: 30 % (ref 22–41)
MCH RBC QN AUTO: 33.1 PG (ref 27–33)
MCHC RBC AUTO-ENTMCNC: 32.7 G/DL (ref 32.2–35.5)
MCV RBC AUTO: 101.3 FL (ref 81.4–97.8)
MONOCYTES # BLD AUTO: 0.38 K/UL (ref 0–0.85)
MONOCYTES NFR BLD AUTO: 5.3 % (ref 0–13.4)
NEUTROPHILS # BLD AUTO: 4.38 K/UL (ref 1.82–7.42)
NEUTROPHILS NFR BLD: 61.5 % (ref 44–72)
NRBC # BLD AUTO: 0 K/UL
NRBC BLD-RTO: 0 /100 WBC (ref 0–0.2)
PLATELET # BLD AUTO: 253 K/UL (ref 164–446)
PMV BLD AUTO: 10.5 FL (ref 9–12.9)
POTASSIUM SERPL-SCNC: 4 MMOL/L (ref 3.6–5.5)
PROT SERPL-MCNC: 7.4 G/DL (ref 6–8.2)
RBC # BLD AUTO: 3.99 M/UL (ref 4.2–5.4)
RHEUMATOID FACT SER IA-ACNC: <10 IU/ML (ref 0–14)
SODIUM SERPL-SCNC: 136 MMOL/L (ref 135–145)
T4 FREE SERPL-MCNC: 1.08 NG/DL (ref 0.93–1.7)
TSH SERPL DL<=0.005 MIU/L-ACNC: 1.98 UIU/ML (ref 0.38–5.33)
URATE SERPL-MCNC: 3.6 MG/DL (ref 1.9–8.2)
WBC # BLD AUTO: 7.1 K/UL (ref 4.8–10.8)

## 2023-09-05 PROCEDURE — 82550 ASSAY OF CK (CPK): CPT

## 2023-09-05 PROCEDURE — 86431 RHEUMATOID FACTOR QUANT: CPT

## 2023-09-05 PROCEDURE — 86225 DNA ANTIBODY NATIVE: CPT

## 2023-09-05 PROCEDURE — 84439 ASSAY OF FREE THYROXINE: CPT

## 2023-09-05 PROCEDURE — 83516 IMMUNOASSAY NONANTIBODY: CPT

## 2023-09-05 PROCEDURE — 85025 COMPLETE CBC W/AUTO DIFF WBC: CPT

## 2023-09-05 PROCEDURE — 84550 ASSAY OF BLOOD/URIC ACID: CPT

## 2023-09-05 PROCEDURE — 80053 COMPREHEN METABOLIC PANEL: CPT

## 2023-09-05 PROCEDURE — 86235 NUCLEAR ANTIGEN ANTIBODY: CPT | Mod: 91

## 2023-09-05 PROCEDURE — 36415 COLL VENOUS BLD VENIPUNCTURE: CPT

## 2023-09-05 PROCEDURE — 85652 RBC SED RATE AUTOMATED: CPT

## 2023-09-05 PROCEDURE — 82085 ASSAY OF ALDOLASE: CPT

## 2023-09-05 PROCEDURE — 84443 ASSAY THYROID STIM HORMONE: CPT

## 2023-09-05 PROCEDURE — 86200 CCP ANTIBODY: CPT

## 2023-09-06 LAB — ERYTHROCYTE [SEDIMENTATION RATE] IN BLOOD BY WESTERGREN METHOD: 9 MM/HOUR (ref 0–25)

## 2023-09-07 LAB
ALDOLASE SERPL-CCNC: 3.3 U/L (ref 1.2–7.6)
CCP IGA+IGG SERPL IA-ACNC: 5 UNITS (ref 0–19)
CHROMATIN IGG SERPL-ACNC: 9 UNITS (ref 0–19)
DSDNA AB TITR SER CLIF: NORMAL {TITER}

## 2023-09-08 ENCOUNTER — NON-PROVIDER VISIT (OUTPATIENT)
Dept: NEUROLOGY | Facility: MEDICAL CENTER | Age: 43
End: 2023-09-08
Attending: PSYCHIATRY & NEUROLOGY
Payer: COMMERCIAL

## 2023-09-08 DIAGNOSIS — R29.898 WEAKNESS OF BOTH LOWER EXTREMITIES: ICD-10-CM

## 2023-09-08 LAB
CENTROMERE IGG TITR SER IF: 0 AU/ML (ref 0–40)
ENA JO1 AB TITR SER: 1 AU/ML (ref 0–40)
ENA SCL70 IGG SER QL: 1 AU/ML (ref 0–40)
ENA SM IGG SER-ACNC: 2 AU/ML (ref 0–40)
ENA SS-B IGG SER IA-ACNC: 1 AU/ML (ref 0–40)
SSA52 R0ENA AB IGG Q0420: 1 AU/ML (ref 0–40)
SSA60 R0ENA AB IGG Q0419: 9 AU/ML (ref 0–40)
U1 SNRNP IGG SER QL: 4 UNITS (ref 0–19)

## 2023-09-08 PROCEDURE — 95909 NRV CNDJ TST 5-6 STUDIES: CPT | Performed by: PSYCHIATRY & NEUROLOGY

## 2023-09-08 PROCEDURE — 95886 MUSC TEST DONE W/N TEST COMP: CPT | Performed by: PSYCHIATRY & NEUROLOGY

## 2023-09-08 NOTE — PROCEDURES
"NERVE CONDUCTION STUDIES AND ELECTROMYOGRAPHY REPORT  [facility]  09/08/23          IMPRESSION:  This is a normal study of the lower extremities. There is no electrophysiologic evidence of a large fiber peripheral neuropathy, myopathy,  or left lumbosacral radiculopathy.       Carla Hunt MD  Neurology - Neurophysiology              REASON FOR REFERRAL:  Ms. Ozzy Do 43 y.o. referred by Dr. Melissa Bloch for evaluation of weakness and tremors of unclear etiology.  This is associated with the paresthesias.    Height: 5'3\"  Weight: 160 lbs      ELECTRODIAGNOSTIC EXAMINATION:  Nerve conduction studies (NCS) and electromyography (EMG) are utilized to evaluate direct or indirect damage to the peripheral nervous system. NCS are performed to measure the nerve(s) response(s) to electrostimulation across a given nerve segment. EMG evaluates the passive and active electrical activity of the muscle(s) in question.  Muscles are innervated by specific peripheral nerves and roots. Often times, several nerves the muscle to be examined in order to determine the presence or absence of the disease process. Furthermore, nerves and muscles may need to be tested in a [id number] comparison, as well as in additional extremities, as this may be crucial in characterizing the extent of the disease process, which may be diffuse or isolated and of varying degree of severity. The extent of the neurodiagnostic exam is justified as it may help arrive to a proper diagnosis, which ultimately may contribute to better management of the patient. Therefore, the nerves to muscles examined during the study were medically necessary.    Unless otherwise noted, temperature of the extremity(s) study was monitored before and during the examination and remained between 32 and 36 degrees C for the upper extremities, and between 30 and 36 degrees C for the lower extremities. The patient tolerated testing well, without any " complications.       NERVE CONDUCTION STUDY SUMMARY:  Selected nerves of the bilateral lower extremity are studied.    Normal bilateral sural sensory responses.  Normal bilateral common peroneal motor responses.  Normal bilateral tibial motor responses.       NEEDLE EMG SUMMARY:  Concentric needle study of selected left lower extremity muscles is performed.     Insertion activity is normal in all muscles sampled.   With activation, there are normal morphology (amplitude/duration) motor unit action potentials firing with normal recruitment in muscles tested.       PATIENT DATA TABLES  Nerve Conduction Studies     Stim Site NR Onset (ms) Norm Onset (ms) O-P Amp (µV) Norm O-P Amp Site1 Site2 Delta-P (ms) Dist (cm) Fortunato (m/s) Norm Fortunato (m/s)   Left Sural Anti Sensory (Lat Mall)  23.5 °C   Calf    3.0 <4.5 7.6 >5 Calf Lat Mall 3.5 14.0 40 >40   Right Sural Anti Sensory (Lat Mall)  23.9 °C   Calf    3.0 <4.5 6.5 >5 Calf Lat Mall 3.4 14.0 41 >40        Stim Site NR Onset (ms) Norm Onset (ms) O-P Amp (mV) Norm O-P Amp Site1 Site2 Delta-0 (ms) Dist (cm) Fortunato (m/s) Norm Fortunato (m/s)   Left Peroneal EDB Motor (Ext Dig Brev)  23.3 °C   Ankle    3.9 <5.5 7.0 >3.0 B Fib Ankle 6.2 33.0 53 >40   B Fib    10.1  6.5  Poplt B Fib 1.2 10.0 83 >40   Poplt    11.3  5.2          Right Peroneal EDB Motor (Ext Dig Brev)  23.8 °C   Ankle    4.4 <5.5 7.4 >3.0 B Fib Ankle 6.0 33.0 55 >40   B Fib    10.4  7.1  Poplt B Fib 1.0 0.0  >40   Poplt    11.4  6.9          Left Tibial Motor (Abd Jason Brev)  23.8 °C   Ankle    3.5 <6 12.6 >8 Knee Ankle 6.0 36.0 60 >40   Knee    9.5  10.8          Right Tibial Motor (Abd Jason Brev)  23.8 °C   Ankle    3.8 <6 13.4 >8 Knee Ankle 7.1 36.0 51 >40   Knee    10.9  11.0                              Electromyography     Side Muscle Nerve Root Ins Act Fibs Psw Amp Dur Poly Recrt Int Pat Comment   Left AntTibialis Dp Br Fibular L4-5 Nml Nml Nml Nml Nml 0 Nml Nml    Left Gastroc Tibial S1-2 Nml Nml Nml Nml Nml 0 Nml Nml     Left VastusLat Femoral L2-4 Nml Nml Nml Nml Nml 0 Nml Nml    Left GluteusMed SupGluteal L5-S1 Nml Nml Nml Nml Nml 0 Nml Nml    Left Lumbo Parasp Low Rami L5-S1 Nml Nml Nml

## 2023-09-19 ENCOUNTER — HOSPITAL ENCOUNTER (OUTPATIENT)
Dept: RADIOLOGY | Facility: MEDICAL CENTER | Age: 43
End: 2023-09-19
Attending: INTERNAL MEDICINE
Payer: COMMERCIAL

## 2023-09-19 DIAGNOSIS — M25.50 POLYARTHRALGIA: ICD-10-CM

## 2023-09-19 PROCEDURE — 77077 JOINT SURVEY SINGLE VIEW: CPT

## 2023-09-19 PROCEDURE — 73562 X-RAY EXAM OF KNEE 3: CPT | Mod: LT

## 2023-09-21 ENCOUNTER — HOSPITAL ENCOUNTER (OUTPATIENT)
Dept: LAB | Facility: MEDICAL CENTER | Age: 43
End: 2023-09-21
Attending: INTERNAL MEDICINE
Payer: COMMERCIAL

## 2023-09-21 ENCOUNTER — OFFICE VISIT (OUTPATIENT)
Dept: RHEUMATOLOGY | Facility: MEDICAL CENTER | Age: 43
End: 2023-09-21
Attending: INTERNAL MEDICINE
Payer: COMMERCIAL

## 2023-09-21 VITALS
DIASTOLIC BLOOD PRESSURE: 62 MMHG | RESPIRATION RATE: 14 BRPM | TEMPERATURE: 97.3 F | OXYGEN SATURATION: 97 % | BODY MASS INDEX: 28.52 KG/M2 | HEART RATE: 81 BPM | SYSTOLIC BLOOD PRESSURE: 110 MMHG | WEIGHT: 161 LBS

## 2023-09-21 DIAGNOSIS — M25.50 POLYARTHRALGIA: ICD-10-CM

## 2023-09-21 DIAGNOSIS — R25.1 TREMORS OF NERVOUS SYSTEM: ICD-10-CM

## 2023-09-21 DIAGNOSIS — R42 DIZZINESS: ICD-10-CM

## 2023-09-21 PROCEDURE — 99211 OFF/OP EST MAY X REQ PHY/QHP: CPT | Performed by: INTERNAL MEDICINE

## 2023-09-21 PROCEDURE — 3078F DIAST BP <80 MM HG: CPT | Performed by: INTERNAL MEDICINE

## 2023-09-21 PROCEDURE — 99215 OFFICE O/P EST HI 40 MIN: CPT | Performed by: INTERNAL MEDICINE

## 2023-09-21 PROCEDURE — 86812 HLA TYPING A B OR C: CPT

## 2023-09-21 PROCEDURE — 3074F SYST BP LT 130 MM HG: CPT | Performed by: INTERNAL MEDICINE

## 2023-09-21 PROCEDURE — 83516 IMMUNOASSAY NONANTIBODY: CPT

## 2023-09-21 ASSESSMENT — FIBROSIS 4 INDEX: FIB4 SCORE: 0.65

## 2023-09-21 NOTE — PROGRESS NOTES
Chief Complaint- joint pain     Subjective:   Ozzy Do is a 43 y.o. female here today for follow up of rheumatological issues      This is a follow-up visit for this patient, second visit with us for evaluation of polyarthralgias rash and tremors.  Patient's brain MRI also found to have white matter changes however status post evaluation by neurology Dr. Arredondo who felt patient does not have MS but also does not had a definitive diagnosis for patient's tremor dizziness and white matter changes on the brain MRI April 2023.  Patient referred to rheumatology for further evaluation.  At last visit patient did not have any definitive rheumatological findings, we did order a number of x-rays and blood tests all of which come back negative.  Patient continues to have problems with dizziness and also states she has palpitations status post cardiac monitor per patient report which was deemed normal.  Patient also recently status post EMG/NCV which has been normal.  Patient also status post echocardiogram which was normal.  Did have an appoint with dermatology but because she did not have a rash at the time nothing was pursued.  Patient denies any fevers of unknown etiology, denies any chronic urethritis, denies any iritis uveitis, patient states her hands swell but not specific joints at all of her swelling.  Patient also complains of stabbing pains into her fingers that can last for minutes or an hour.  Of note recent sedimentation rate equals 9 September 2023     PMHx  Asthma  Tremors, possible seizures     Fam Hx  M-RA, fibromyalgia, depression  F-CAD, HTN  S-thrombocytopenia,      Soc Hx  Pos tobacco, quit 2013  ETOH 4-5/week  No blood tx  Positive tattoos  No IVDA  Patient works as a hairdresser      Addendum 2/24/2024  SSA neg 2/2024  SSB neg 2/2024  MEAGAN-1 neg 2/2024  PM/Scl 100 neg 2/2024      Addendum 2/20/2024  F actin neg 2/2024  AMA neg 2/2024  Thyroglobulin neg 2/2024  TPO neg 2/2024    Addendum  9/25/2023  MPO neg 9/2023  PR3 neg 9/2023  HLA B27 nrg 9/2023     dsDNA neg 9/2023  Bridges neg 9/2023  Scl-70 neh 9/2023  SSA neg 9/2023  SSB neg 9/2023  MEAGAN-1 neg 9/2023  Centromere neg 9/2023  RF neg 9/2023  CCP neg 9/2023  SSA neg 6/2023  SSB neg 6/2023  Lyme neg 6/2023  Uric acid 3.6 nl 9/2023  CPK 56 nl 9/2023  Aldolase 2.3 nl 9/2023    Hand x-rays 9/2023-IMPRESSION:  No erosive changes identified.    Feet x-rays 9/2023-IMPRESSION:  Minimal degenerative changes of the MTP joints. No erosive changes.    Left Knee x-ray 9/2023-IMPRESSION:  Mildly narrowed sclerotic medial knee joint compartment. No erosive changes identified.    MRI Brain 4/2023-IMPRESSION:  MRI of the brain without contrast within normal limits for age with scant RIGHT frontal white matter changes.    MRI C spine 6/2023-IMPRESSION:  Contrast enhanced MRI of the cervical spine within normal limits.    MRI T spine 6/2023-IMPRESSION:  MRI of the thoracic spine without and with contrast within normal limits for age.    EMG/NCV Bilateral LE 9/2023-IMPRESSION:  This is a normal study of the lower extremities. There is no electrophysiologic evidence of a large fiber peripheral neuropathy, myopathy,  or left lumbosacral radiculopathy.   Carla Hunt MD  Neurology - Neurophysiology    Echocardiogram 4/2021-CONCLUSIONS  Normal echocardiogram study.          Current Outpatient Medications   Medication Sig Dispense Refill    FLUoxetine (PROZAC) 20 MG Cap Take 20 mg by mouth every day.      Bacillus Coagulans-Inulin (PROBIOTIC) 1-250 BILLION-MG Cap       cetirizine (ZYRTEC) 10 MG Tab Take 10 mg by mouth 1 time a day as needed for Allergies.      fluoxetine (PROZAC) 40 MG capsule Take 40 mg by mouth every day.      MAXWELL 1/20 1-20 MG-MCG per tablet Take 1 Tab by mouth every day.      gabapentin (NEURONTIN) 100 MG Cap Take 1 Capsule by mouth 3 times a day for 180 days. 90 Capsule 5     No current facility-administered medications for this visit.     She  has a  past medical history of Asthma, Awareness under anesthesia, PONV (postoperative nausea and vomiting), and Psychiatric problem.    ROS   Other than what is mentioned in HPI or physical exam, there is no history of headaches, double vision or blurred vision.  No trouble swallowing difficulties .  No chest complaints including chest pain, cough or sputum production. No GI complaints including nausea, vomiting, change in bowel habits, or past peptic ulcer disease. No history of blood in the stools. No urinary complaints including dysuria or frequency. No history of alopecia, photosensitivity     Objective:     /62   Pulse 81   Temp 36.3 °C (97.3 °F) (Temporal)   Resp 14   Wt 73 kg (161 lb)   SpO2 97%  Body mass index is 28.52 kg/m².   Physical Exam:    Constitutional: Alert and oriented X3, patient is talkative with good eye contact.Skin: Warm, dry, good turgor, no rashes in visible areas.Eye: Equal, round and reactive, conjunctiva clear, lids normal EOM intactENMT: Lips without lesions,  pinna without deformityNeck: Trachea midline, no masses, no thyromegaly.Lymph:  No cervical lymphadenopathy, no axillary lymphadenopathy, no supraclavicular lymphadenopathyRespiratory: Unlabored respiratory effort, lungs clear to auscultation, no wheezes, no ronchi.Cardiovascular: Normal S1, S2, Regular rate and rhythm, no murmurs rubs or gallops  .Abdomen: Soft, non-distended.Psych: Alert and oriented x3, normal affect and mood.Neuro: Cranial nerves 2-12 are grossly intact Ext:no joint laxity noted in bilateral arms, no joint laxity noted in bilateral legs, patient states her fingers are swollen today but no evidence of synovitis, able to see folds in finger joints easily, feels pain, no Raynaud's, no flexion contractures no dactylitis, shoulders full range of motion without limitations, elbows without first contractures no nodules no tophi, knees minimal crepitus but no synovitis, toes without crossover toes without splay  toes, no Achilles inflammation no enthesitis appreciated    Lab Results   Component Value Date/Time    SODIUM 136 09/05/2023 12:02 PM    POTASSIUM 4.0 09/05/2023 12:02 PM    CHLORIDE 103 09/05/2023 12:02 PM    CO2 22 09/05/2023 12:02 PM    GLUCOSE 85 09/05/2023 12:02 PM    BUN 15 09/05/2023 12:02 PM    CREATININE 0.92 09/05/2023 12:02 PM      Lab Results   Component Value Date/Time    WBC 7.1 09/05/2023 12:02 PM    RBC 3.99 (L) 09/05/2023 12:02 PM    HEMOGLOBIN 13.2 09/05/2023 12:02 PM    HEMATOCRIT 40.4 09/05/2023 12:02 PM    .3 (H) 09/05/2023 12:02 PM    MCH 33.1 (H) 09/05/2023 12:02 PM    MCHC 32.7 09/05/2023 12:02 PM    MPV 10.5 09/05/2023 12:02 PM    NEUTSPOLYS 61.50 09/05/2023 12:02 PM    LYMPHOCYTES 30.00 09/05/2023 12:02 PM    MONOCYTES 5.30 09/05/2023 12:02 PM    EOSINOPHILS 2.30 09/05/2023 12:02 PM    BASOPHILS 0.60 09/05/2023 12:02 PM      Lab Results   Component Value Date/Time    CALCIUM 9.1 09/05/2023 12:02 PM    ASTSGOT 15 09/05/2023 12:02 PM    ALTSGPT 14 09/05/2023 12:02 PM    ALKPHOSPHAT 53 09/05/2023 12:02 PM    TBILIRUBIN 0.3 09/05/2023 12:02 PM    ALBUMIN 4.5 09/05/2023 12:02 PM    TOTPROTEIN 7.4 09/05/2023 12:02 PM     Lab Results   Component Value Date/Time    URICACID 3.6 09/05/2023 12:02 PM    RHEUMFACTN <10 09/05/2023 12:02 PM     Lab Results   Component Value Date/Time    ANTIDNADS SEE BELOW 09/05/2023 12:02 PM    RNPAB 4 09/05/2023 12:02 PM    SMITHAB 2 09/05/2023 12:02 PM    DFDNQOA32 1 09/05/2023 12:02 PM    CENTROMBAB 0 09/05/2023 12:02 PM     Lab Results   Component Value Date/Time    ANTIDNADS SEE BELOW 09/05/2023 12:02 PM    JO1AB 1 09/05/2023 12:02 PM    RNPAB 4 09/05/2023 12:02 PM    ANTISSBSJ 1 09/05/2023 12:02 PM     Lab Results   Component Value Date/Time    SEDRATEWES 9 09/05/2023 12:02 PM     Lab Results   Component Value Date/Time    CPKTOTAL 56 09/05/2023 12:02 PM     Assessment and Plan:     1. Polyarthralgia  Unclear etiology, thus far work-up negative for any  rheumatological disorders, we will check ANCA and HLA-B27 , Patient states her worst joints are in her right hand we will do MRI of right hand for further evaluation.  - HLA-B27  - MPO AND PR3 WITH REFLEX TO ANCA; Future  - MR-HAND - W/O RIGHT; Future    2. Tremors of nervous system  Currently being evaluated by neurology Dr. Arredondo, patient states no definitive answer as of yet, patient report wonders about referral to [facility] or [facility] for evaluation.  Will defer to neurology.    3. Dizziness  Currently getting evaluated by neurology Dr. Arredondo, no definitive answer as of yet per patient report    Followup: Return in about 6 months (around 3/21/2024). or sooner prolvin Do  was seen 40 minutes face-to-face of which more than 50% of the time was spent counseling the patient (excluding time for procedures)  regarding  rheumatological condition and care. Therapy was discussed in detail.      Please note that this dictation was created using voice recognition software. I have made every reasonable attempt to correct obvious errors, but I expect that there are errors of grammar and possibly content that I did not discover before finalizing the note.

## 2023-09-22 LAB — HLA-B27 QL FC: NEGATIVE

## 2023-09-24 LAB
MYELOPEROXIDASE AB SER-ACNC: 0 AU/ML (ref 0–19)
PROTEINASE3 AB SER-ACNC: 3 AU/ML (ref 0–19)

## 2023-09-26 ENCOUNTER — OFFICE VISIT (OUTPATIENT)
Dept: NEUROLOGY | Facility: MEDICAL CENTER | Age: 43
End: 2023-09-26
Attending: PSYCHIATRY & NEUROLOGY
Payer: COMMERCIAL

## 2023-09-26 VITALS
BODY MASS INDEX: 28.32 KG/M2 | TEMPERATURE: 98.1 F | HEART RATE: 78 BPM | HEIGHT: 63 IN | OXYGEN SATURATION: 97 % | SYSTOLIC BLOOD PRESSURE: 102 MMHG | DIASTOLIC BLOOD PRESSURE: 62 MMHG | WEIGHT: 159.83 LBS

## 2023-09-26 DIAGNOSIS — R25.1 TREMOR: ICD-10-CM

## 2023-09-26 PROCEDURE — 3078F DIAST BP <80 MM HG: CPT | Performed by: PSYCHIATRY & NEUROLOGY

## 2023-09-26 PROCEDURE — 3074F SYST BP LT 130 MM HG: CPT | Performed by: PSYCHIATRY & NEUROLOGY

## 2023-09-26 PROCEDURE — 99215 OFFICE O/P EST HI 40 MIN: CPT | Performed by: PSYCHIATRY & NEUROLOGY

## 2023-09-26 PROCEDURE — 99212 OFFICE O/P EST SF 10 MIN: CPT | Performed by: PSYCHIATRY & NEUROLOGY

## 2023-09-26 ASSESSMENT — FIBROSIS 4 INDEX: FIB4 SCORE: 0.65

## 2023-09-26 NOTE — ASSESSMENT & PLAN NOTE
Pt has more tremor in her left side and she feels an inner restlessness. 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  Patient feels an internal tremor at times that she does not see.  Sleep and stress can make the tremor worse but not always.  She has not noticed a change with her being on Prozac over the last 20 years and was increased to 60 from 40 about a year ago but did not notice the tremor dramatically increased.  The tremor has been there for about 6 months.  She has tried a small amount of alcohol which she does think helps.  Patient has also reviewed information on Parkinson's and has a friend with Parkinson's early-onset and she is somewhat concerned about that diagnosis also based on her symptoms.  She does have a little bit of stiffness or rigidity in her arms and sometimes her legs and knees.  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

## 2023-09-27 NOTE — PROGRESS NOTES
Chief Complaint   Patient presents with    Follow-Up     Ms  Tremors/ episodes        Problem List Items Addressed This Visit       Tremor     Pt has more tremor in her left side and she feels an inner restlessness. 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  Patient feels an internal tremor at times that she does not see.  Sleep and stress can make the tremor worse but not always.  She has not noticed a change with her being on Prozac over the last 20 years and was increased to 60 from 40 about a year ago but did not notice the tremor dramatically increased.  The tremor has been there for about 6 months.  She has tried a small amount of alcohol which she does think helps.  Patient has also reviewed information on Parkinson's and has a friend with Parkinson's early-onset and she is somewhat concerned about that diagnosis also based on her symptoms.  She does have a little bit of stiffness or rigidity in her arms and sometimes her legs and knees.  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            History of present illness:  Ozzy Do 43 y.o. female presents today for of her tremor and negative rheumatologic work-up.    Past medical history:   Past Medical History:   Diagnosis Date    Asthma     Awareness under anesthesia     PONV (postoperative nausea and vomiting)     Psychiatric problem     Depression       Past surgical history:   Past Surgical History:   Procedure Laterality Date    PB KNEE SCOPE,DIAGNOSTIC Left 7/28/2021    Procedure: ARTHROSCOPY, KNEE;  Surgeon: Rick Christensen M.D.;  Location: [facility];  Service: Orthopedics    PB KNEE SCOPE,MED/LAT MENISECTOMY Left 7/28/2021    Procedure: MENISCECTOMY, KNEE, MEDIAL;  Surgeon: Rick Christensen M.D.;  Location: [facility];  Service: Orthopedics    OTHER ORTHOPEDIC SURGERY Right 1997    Shoulder    OTHER       "Tooth extraction X1       Family history:   Family History   Problem Relation Age of Onset    Cancer Paternal Grandmother     Heart Disease Father     Heart Failure Father        Social history:   Social History     Socioeconomic History    Marital status:      Spouse name: Not on file    Number of children: Not on file    Years of education: Not on file    Highest education level: Not on file   Occupational History    Not on file   Tobacco Use    Smoking status: Former     Current packs/day: 0.00     Types: Cigarettes     Start date:      Quit date:      Years since quittin.7    Smokeless tobacco: Never    Tobacco comments:     \"On and off\"   Vaping Use    Vaping Use: Former    Substances: Nicotine   Substance and Sexual Activity    Alcohol use: Yes     Comment: occasional     Drug use: Yes     Types: Oral     Comment: THC    Sexual activity: Yes     Partners: Male     Birth control/protection: Pill   Other Topics Concern    Not on file   Social History Narrative    Not on file     Social Determinants of Health     Financial Resource Strain: Not on file   Food Insecurity: Not on file   Transportation Needs: Not on file   Physical Activity: Not on file   Stress: Not on file   Social Connections: Not on file   Intimate Partner Violence: Not on file   Housing Stability: Not on file       Current medications:   Current Outpatient Medications   Medication    FLUoxetine (PROZAC) 20 MG Cap    Bacillus Coagulans-Inulin (PROBIOTIC) 1-250 BILLION-MG Cap    cetirizine (ZYRTEC) 10 MG Tab    fluoxetine (PROZAC) 40 MG capsule    MAXWELL  1-20 MG-MCG per tablet     No current facility-administered medications for this visit.       Medication Allergy:  Allergies   Allergen Reactions    Tape Rash     \"Burns and skin peels off with bandaids and regular tape\" Does not know about paper tape.    Ceclor [Cefaclor]        Review of systems:   Constitutional: denies fever, night sweats, weight loss.   Eyes: denies " "acute vision change, eye pain or secretion.   Ears, Nose, Mouth, Throat: denies nasal secretion, nasal bleeding, difficulty swallowing, hearing loss, tinnitus, vertigo, ear pain, acute dental problems, oral ulcers or lesions.   Endocrine: denies recent weight changes, heat or cold intolerance, polyuria, polydypsia, polyphagia,abnormal hair growth.  Cardiovascular: denies new onset of chest pain, palpitations, syncope, or dyspnea of exertion.  Pulmonary: denies shortness of breath, new onset of cough, hemoptysis, wheezing, chest pain or flu-like symptoms.   GI: denies nausea, vomiting, diarrhea, GI bleeding, change in appetite, abdominal pain, and change in bowel habits.  : denies dysuria, urinary incontinence, hematuria.  Heme/oncology: denies history of easy bruising or bleeding. No history of cancer, DVTor PE.  Allergy/immunology: denies hives/urticaria, or itching.   Dermatologic: denies new rash, or new skin lesions.  Musculoskeletal:denies joint swelling or pain, muscle pain, neck and back pain. Neurologic: denies headaches, acute visual changes, facial droopiness, muscle weakness (focal or generalized), paresthesias, anesthesia, ataxia, change in speech or language, memory loss, abnormal movements, seizures, loss of consciousness, or episodes of confusion.   Psychiatric: denies symptoms of depression, anxiety, hallucinations, mood swings or changes, suicidal or homicidal thoughts.     Physical examination:   Vitals:    09/26/23 1554   BP: 102/62   BP Location: Right arm   Patient Position: Sitting   BP Cuff Size: Large adult   Pulse: 78   Temp: 36.7 °C (98.1 °F)   TempSrc: Temporal   SpO2: 97%   Weight: 72.5 kg (159 lb 13.3 oz)   Height: 1.6 m (5' 2.99\")     General: Patient in no acute distress, pleasant and cooperative.  HEENT: Normocephalic, no signs of acute trauma.   Neck: supple, no meningeal signs or carotid bruits. There is normal range of motion. No tenderness on exam.   Chest: clear to " auscultation. No cough.   CV: RRR, no murmurs.   Skin: no signs of acute rashes or trauma.   Musculoskeletal: joints exhibit full range of motion, without any pain to palpation. There are no signs of joint or muscle swelling. There is no tenderness to deep palpation of muscles.   Psychiatric: No hallucinatory behavior. Denies symptoms of depression or suicidal ideation. Mood and affect appear normal on exam.     NEUROLOGICAL EXAM:   Mental status, orientation: Awake, alert and fully oriented.   Speech and language: speech is clear and fluent. The patient is able to name, repeat and comprehend.   Memory: There is intact recollection of recent and remote events.   Cranial nerve exam: Pupils are 3-4 mm bilaterally and equally reactive to light and accommodation. Visual fields are intact by confrontation. Fundoscopic exam was unremarkable. There is no nystagmus on primary or secondary gaze. Intact full EOM in all directions of gaze. Face appears symmetric. Sensation in the face is intact to light touch. Uvula is midline. Palate elevates symmetrically. Tongue is midline and without any signs of tongue biting or fasciculations. Sternocleidomastoid muscles exhibit is normal strength bilaterally. Shoulder shrug is intact bilaterally.   Motor exam: Strength is 5-/5 in all extremities. Tone is good on the left side.  Prominent tremor on her left side but also present on her right.  Tremor is amplified with holding her arms out in front of her but there is some rest tremor visible in her left hand occasionally.  There is no change in her writing noticed in her writing sample was done today.  Sensory exam reveals normal sense of light touch, proprioception, vibration and pinprick in all extremities.   Deep tendon reflexes:  3+ throughout. Plantar responses are flexor. There is no clonus.   Coordination: shows a normal finger-nose-finger and Normal rapidly alternating movements the hair slow on her left  Gait: The patient was  able to get up from seated position on first attempt without requiring assistance. Found to be steady when walking. Movements were fluid with normal arm swing. The patient was able to turn without difficulties or tendency to fall. Romberg examination neg      ANCILLARY DATA REVIEWED:     Lab Data Review:  No results found for this or any previous visit (from the past 24 hour(s)).    Records reviewed: Reviewed all records from her rheumatologist, x-rays and laboratory testing.  Negative EMG nerve conduction study.      Imaging: I reviewed last neuroimaging of the brain from April.          ASSESSMENT AND PLAN:    1. Tremor  The tremor is the neurologic symptom that is becoming most concerning and more amplified in the course of our work-up.  There are elements of her tremor that seem most consistent with essential tremor and that it responds to a small amount of alcohol and also it is worse when she is anxious or sleep deprived.  However because of parkinsonian concerns with the unilateral worsening and internal restlessness I will refer her to Dr. Armas for his evaluation.  We discussed whether she would benefit from a DaTscan or trial of any other medication.  Because of her asthma working to hold off on a beta-blocker at this point.  I would like to repeat a brain scan at approximately a year however I think the small amount of white matter change seen on her brain scan is not consistent with her symptoms.  Patient is also going to decrease her Prozac from 60 mg to 40 mg a day which was her previous standard dose and see how she feels.  Patient also has a consult into psychiatry.  She is awaiting appointment with them.  Patient can come back to me if necessary or follow-up with Dr. Armas based on his opinion.  She will make an appointment to see him today.        FOLLOW-UP:   Movement D/O clinic with Dr Armas      My total time spent caring for the patient on the day of the encounter was 48 minutes.   This does  not include time spent on separately billable procedures/tests.     EDUCATION AND COUNSELING:  -Discussed regular exercise program and prevention of cardiovascular disease, including stroke.   -Discussed healthy lifestyle, including: healthy diet (rich in fruits, vegetables, nuts and healthy oils); proper hydration, and adequate sleep hygiene (allowing 7-8 hrs of overnight sleep).        Melissa Bloch, MD  Clinical  of Neurology [facility].   Diplomate in Neurology.   Office: [phone]  Fax: [phone]

## 2023-10-05 ENCOUNTER — OFFICE VISIT (OUTPATIENT)
Dept: NEUROLOGY | Facility: MEDICAL CENTER | Age: 43
End: 2023-10-05
Attending: INTERNAL MEDICINE
Payer: COMMERCIAL

## 2023-10-05 VITALS
HEIGHT: 63 IN | OXYGEN SATURATION: 97 % | DIASTOLIC BLOOD PRESSURE: 72 MMHG | BODY MASS INDEX: 28.59 KG/M2 | WEIGHT: 161.38 LBS | SYSTOLIC BLOOD PRESSURE: 108 MMHG | TEMPERATURE: 97.4 F | HEART RATE: 75 BPM

## 2023-10-05 DIAGNOSIS — R25.1 PHYSIOLOGICAL TREMOR: ICD-10-CM

## 2023-10-05 PROCEDURE — 99211 OFF/OP EST MAY X REQ PHY/QHP: CPT | Performed by: INTERNAL MEDICINE

## 2023-10-05 PROCEDURE — 99215 OFFICE O/P EST HI 40 MIN: CPT | Performed by: INTERNAL MEDICINE

## 2023-10-05 PROCEDURE — 3078F DIAST BP <80 MM HG: CPT | Performed by: INTERNAL MEDICINE

## 2023-10-05 PROCEDURE — 3074F SYST BP LT 130 MM HG: CPT | Performed by: INTERNAL MEDICINE

## 2023-10-05 ASSESSMENT — PAIN SCALES - GENERAL: PAINLEVEL: 4=SLIGHT-MODERATE PAIN

## 2023-10-05 ASSESSMENT — FIBROSIS 4 INDEX: FIB4 SCORE: 0.65

## 2023-10-05 NOTE — PROGRESS NOTES
[facility]  [address] [place] [address], [address]. [place] [address]  Phone: [phone] Patient Name: Ozzy Do  : 1980  MRN: [id number]     ASSESSMENT / PLAN       Ozzy Do is a 43 y.o. LHD female presenting for tremors.     Enhanced physiologic tremor  Exam today showed mild left arm high-frequency postural and intention tremor consistent with physiologic tremor.  It is not rhythmic like in essential tremor, but treatments for either diagnoses are the same.  She notes that it is mild today in comparison to when it is bothersome at work for example. Rare cases SSRI can worsen tremors but fluctuations in situations and no improvement with decreased dose make it less likely to be a culprit.   - Encourage regular aerobic exercise to restore balance of the sympathetic and parasympathetic nervous system  - Continue avoiding stimulants  - Discussed certain medications that can be started, would avoid propanolol in case it aggravates her asthma and exercise tolerance. She would like to discuss with Dr. Bloch at the next visit in case the tremors are not better. Recommend starting Primidone 50 mg tablet:  Week 1: take half-tab every evening   Week 2: take half-tab twice a day  Week 3: take half-tab three times a day  Week 4: take half-tab morning and noon, and 1 tab in the evening   Week 5: take half-tab in the morning, and 1 tab noon and evening  Week 6 and thereafter: take 1 tab three times a day. Take the first dose when you wake up, then 5 hours apart.    Mood disorder  - Continue Prozac, discuss with primary care in case a switch to SNRI to help with chronic pain can be helpful.  - Vitamin D supplementation for borderline low vitamin D levels.  Can start vitamin D 1000 IU or a multivitamin    Palpitations and lightheadedness  Heart monitor and echocardiogram were normal.  -Increase fluid intake with electrolytes, wear compression stockings  - Regular aerobic exercise such as stationary  "bicycle to strengthen legs and cardiovascular fitness    Insomnia  - Discussed good sleep hygiene, starting melatonin 1 hour before bedtime and taking it consistently for helping with sleep.  Can take melatonin 5 mg tablets nightly.    - Return if symptoms worsen or fail to improve.    Ignacio Armas DO  Neurology, Movement Disorders Specialist    BILLING DOCUMENTATION: I spent 50 minutes reviewing the medical record, interviewing and examining the patient, discussing diagnosis and treatment, and coordinating care.        HISTORY OF PRESENT ILLNESS      Ozzy Do is a 43 y.o. LHD female presenting for tremors.     Tremor: March 2023 onset. Worse with using hands. Can feel tremors internally in legs. Worse with fatigue. Pain in hands and feet, going up the arms. Pain worsens tremors. Tremor improves with alcohol.  Mother also has tremors but intermittent.   Prozac 60mg -> 40mg but didn't help or worsen tremors. Didn't help with mood either.   No supplements. Quit caffeine in March, worsens tremor. Weed drink on occasion.    Asthma in past. Last used inhaler when she had COVID. Notes decreased cardiovascular fitness. Not exercising much due to the tremors. Doing pilates instead.     Sleep: trouble staying asleep and falling asleep. \"Wont stop thinking\". Friend recently passed away.    Family history:  Cousins: diagnosed with dystonia. Drags foot when walking.   Mother's cousin: multiple sclerosis.   Mother: fibromyalgia in 20s, RA in 40s.     Prior workup  MRI thoracic with and without 6/2023: Normal  MRI brain without: 4/2023 normal for age  MRI cervical spine with and without contrast: Normal    NCS/EMG 9/2023:This is a normal study of the lower extremities. There is no electrophysiologic evidence of a large fiber peripheral neuropathy, myopathy,  or left lumbosacral radiculopathy.    Aldolase and CPK normal  MCV elevated 101  Ceruloplasmin normal  Vitamin D 36 borderline low  B12: 705  TSH 1.9  Free T4 " "1.08  RF and CCP negative  TYLER and lupus panel negative      Past Medical History:   Diagnosis Date    Asthma     Awareness under anesthesia     PONV (postoperative nausea and vomiting)     Psychiatric problem     Depression       Past Surgical History:   Procedure Laterality Date    PB KNEE SCOPE,DIAGNOSTIC Left 2021    Procedure: ARTHROSCOPY, KNEE;  Surgeon: Rick Christensen M.D.;  Location: SUR[facility]ERY [facility];  Service: Orthopedics    PB KNEE SCOPE,MED/LAT MENISECTOMY Left 2021    Procedure: MENISCECTOMY, KNEE, MEDIAL;  Surgeon: Rick Christensen M.D.;  Location: SURGERY [facility];  Service: Orthopedics    OTHER ORTHOPEDIC SURGERY Right     Shoulder    OTHER      Tooth extraction X1       Family History   Problem Relation Age of Onset    Cancer Paternal Grandmother     Heart Disease Father     Heart Failure Father        Social History     Socioeconomic History    Marital status:      Spouse name: Not on file    Number of children: Not on file    Years of education: Not on file    Highest education level: Not on file   Occupational History    Not on file   Tobacco Use    Smoking status: Former     Current packs/day: 0.00     Types: Cigarettes     Start date:      Quit date:      Years since quittin.7    Smokeless tobacco: Never    Tobacco comments:     \"On and off\"   Vaping Use    Vaping Use: Former    Substances: Nicotine   Substance and Sexual Activity    Alcohol use: Yes     Comment: occasional     Drug use: Yes     Types: Oral     Comment: THC    Sexual activity: Yes     Partners: Male     Birth control/protection: Pill   Other Topics Concern    Not on file   Social History Narrative    Not on file     Social Determinants of Health     Financial Resource Strain: Not on file   Food Insecurity: Not on file   Transportation Needs: Not on file   Physical Activity: Not on file   Stress: Not on file   Social Connections: Not on file   Intimate Partner Violence: Not on file " "  Housing Stability: Not on file       Current Outpatient Medications   Medication    Bacillus Coagulans-Inulin (PROBIOTIC) 1-250 BILLION-MG Cap    cetirizine (ZYRTEC) 10 MG Tab    fluoxetine (PROZAC) 40 MG capsule    MAXWELL 1/20 1-20 MG-MCG per tablet     No current facility-administered medications for this visit.       Allergies   Allergen Reactions    Tape Rash     \"Burns and skin peels off with bandaids and regular tape\" Does not know about paper tape.    Ceclor [Cefaclor]        OBJECTIVE      Vitals:    10/05/23 0956   BP: 108/72   BP Location: Right arm   Patient Position: Sitting   BP Cuff Size: Large adult   Pulse: 75   Temp: 36.3 °C (97.4 °F)   TempSrc: Temporal   SpO2: 97%   Weight: 73.2 kg (161 lb 6 oz)   Height: 1.6 m (5' 3\")     Physical Exam     General: NAD, appears stated age.      Mental status: speech clear and fluent. Fund of knowledge is good.      Cranial Nerves:  CN2: PERRL. Visual fields are full to finger confrontation.   CN3/4/6: EOMI. There is no nystagmus.   CN5: V1-V3 intact to light touch    CN7: Symmetric face.   CN8: Hearing grossly intact.   CN9/10/12: Soft palate and uvula rise symmetrically. Tongue midline.   CN11: Shoulder shrug intact bilaterally.     Strength  Right Left   Shoulder Abduction  5 5   Elbow Flexion 5 5   Elbow Extension  5 5   Wrist Extension  5 5   Finger Extension  5 5   Hip Flexion  5 5   Knee Extension 5 5   Ankle Dorsiflexion  5 5     Deep Tendon Reflexes: 3+ and symmetric in biceps, brachioradialis, patella and ankles. Plantar reflexes in flexion.      Abnormal movements:    UPDRS Right Left   Finger tapping 0 0   Hand Movement 0 0   Toe Tapping 0 0   Leg Agility 0 0   Rigidity 0 0   Rest Tremor 0 0   Postural Tremor 0 <1cm high freq, low amp   Kinetic Tremor 0 <1cm high freq, low amp      No dystonia, dyskinesias, tics, stereotypies, athetosis, akathisia, or chorea noted.      Cerebellar: No dysmetria with FTN or heel-to-shin.    Gait:   Posture - normal "   Base - narrow   Stride length - normal   Arm swing - normal   Speed - normal  Shuffling/freezing - none  U-Turn - normal   Heel, toe, and tandem - normal           DATA / RESULTS          PROCEDURE     N/A

## 2023-10-05 NOTE — PATIENT INSTRUCTIONS
Start Vit D 1000IU or a multivitamin daily  Increase aerobic exercise, such as stationary bicycle or anything that gets the heart rate up and strengthens the legs

## 2023-10-09 ENCOUNTER — TELEPHONE (OUTPATIENT)
Dept: NEUROLOGY | Facility: MEDICAL CENTER | Age: 43
End: 2023-10-09
Payer: COMMERCIAL

## 2023-10-16 ENCOUNTER — APPOINTMENT (OUTPATIENT)
Dept: NEUROLOGY | Facility: MEDICAL CENTER | Age: 43
End: 2023-10-16
Attending: PSYCHIATRY & NEUROLOGY
Payer: COMMERCIAL

## 2023-10-20 ENCOUNTER — OFFICE VISIT (OUTPATIENT)
Dept: NEUROLOGY | Facility: MEDICAL CENTER | Age: 43
End: 2023-10-20
Attending: PSYCHIATRY & NEUROLOGY
Payer: COMMERCIAL

## 2023-10-20 VITALS
HEIGHT: 63 IN | TEMPERATURE: 97.5 F | SYSTOLIC BLOOD PRESSURE: 106 MMHG | OXYGEN SATURATION: 97 % | HEART RATE: 85 BPM | WEIGHT: 162.7 LBS | DIASTOLIC BLOOD PRESSURE: 66 MMHG | BODY MASS INDEX: 28.83 KG/M2

## 2023-10-20 DIAGNOSIS — R25.1 TREMOR: ICD-10-CM

## 2023-10-20 DIAGNOSIS — R20.2 PARESTHESIAS: ICD-10-CM

## 2023-10-20 DIAGNOSIS — R25.1 PHYSIOLOGICAL TREMOR: ICD-10-CM

## 2023-10-20 DIAGNOSIS — R90.89 ABNORMAL BRAIN MRI: ICD-10-CM

## 2023-10-20 DIAGNOSIS — R93.0 ABNORMAL MRI OF HEAD: ICD-10-CM

## 2023-10-20 PROCEDURE — 3074F SYST BP LT 130 MM HG: CPT | Performed by: PSYCHIATRY & NEUROLOGY

## 2023-10-20 PROCEDURE — 3078F DIAST BP <80 MM HG: CPT | Performed by: PSYCHIATRY & NEUROLOGY

## 2023-10-20 PROCEDURE — 99211 OFF/OP EST MAY X REQ PHY/QHP: CPT | Performed by: PSYCHIATRY & NEUROLOGY

## 2023-10-20 PROCEDURE — 99215 OFFICE O/P EST HI 40 MIN: CPT | Performed by: PSYCHIATRY & NEUROLOGY

## 2023-10-20 RX ORDER — PRIMIDONE 50 MG/1
50 TABLET ORAL 2 TIMES DAILY
Qty: 180 TABLET | Refills: 0 | Status: SHIPPED | OUTPATIENT
Start: 2023-10-20 | End: 2024-01-18

## 2023-10-20 RX ORDER — ACYCLOVIR 50 MG/G
OINTMENT TOPICAL
COMMUNITY
Start: 2023-08-28

## 2023-10-20 ASSESSMENT — FIBROSIS 4 INDEX: FIB4 SCORE: 0.65

## 2023-10-21 NOTE — ASSESSMENT & PLAN NOTE
Patient was evaluated by Dr. Eckert and felt to have physiologic tremor.  She does respond to a small dose of EtOH but that only last several hours.  He did discuss a trial of primidone in his note and we have discussed that today as an option which she is wanting to try.

## 2023-10-21 NOTE — ASSESSMENT & PLAN NOTE
Patient continues to have paresthesias in the soles of her feet primarily when standing they can be painful and I have buzzing sensation at times sometimes she gets that in her upper legs 2.  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

## 2023-10-21 NOTE — ASSESSMENT & PLAN NOTE
Miguel Ángel is a 43-year-old woman who had an MRI in April of this year which showed several white matter abnormalities in the right frontal lobe when she was having neurologic symptoms both cognitive and physical paresthesias and weakness.  In addition she has been having issues with tremor.  Believes symptoms have been waxing and waning but ongoing for several years.  She does feel about of COVID made them worse.

## 2023-10-31 ENCOUNTER — HOSPITAL ENCOUNTER (OUTPATIENT)
Dept: RADIOLOGY | Facility: MEDICAL CENTER | Age: 43
End: 2023-10-31
Attending: FAMILY MEDICINE
Payer: COMMERCIAL

## 2023-10-31 DIAGNOSIS — Z12.31 VISIT FOR SCREENING MAMMOGRAM: ICD-10-CM

## 2023-10-31 PROCEDURE — 77063 BREAST TOMOSYNTHESIS BI: CPT

## 2024-02-15 ENCOUNTER — HOSPITAL ENCOUNTER (OUTPATIENT)
Dept: LAB | Facility: MEDICAL CENTER | Age: 44
End: 2024-02-15
Attending: INTERNAL MEDICINE
Payer: COMMERCIAL

## 2024-02-15 DIAGNOSIS — M25.50 POLYARTHRALGIA: ICD-10-CM

## 2024-02-15 DIAGNOSIS — R76.8 ANA POSITIVE: ICD-10-CM

## 2024-02-15 DIAGNOSIS — R29.898 WEAKNESS OF BOTH LOWER EXTREMITIES: ICD-10-CM

## 2024-02-15 LAB — THYROPEROXIDASE AB SERPL-ACNC: 9 IU/ML (ref 0–9)

## 2024-02-15 PROCEDURE — 86381 MITOCHONDRIAL ANTIBODY EACH: CPT

## 2024-02-15 PROCEDURE — 86376 MICROSOMAL ANTIBODY EACH: CPT

## 2024-02-15 PROCEDURE — 84182 PROTEIN WESTERN BLOT TEST: CPT

## 2024-02-15 PROCEDURE — 86015 ACTIN ANTIBODY EACH: CPT

## 2024-02-15 PROCEDURE — 83516 IMMUNOASSAY NONANTIBODY: CPT

## 2024-02-15 PROCEDURE — 36415 COLL VENOUS BLD VENIPUNCTURE: CPT

## 2024-02-15 PROCEDURE — 86800 THYROGLOBULIN ANTIBODY: CPT

## 2024-02-15 PROCEDURE — 86235 NUCLEAR ANTIGEN ANTIBODY: CPT

## 2024-02-17 LAB
MITOCHONDRIA M2 IGG SER-ACNC: 9.5 UNITS (ref 0–24.9)
SMA IGG SER-ACNC: 7 UNITS (ref 0–19)
THYROGLOB AB SERPL-ACNC: <0.9 IU/ML (ref 0–4)

## 2024-02-23 LAB
ANNOTATION COMMENT IMP: ABNORMAL
EJ AB SER QL: NEGATIVE
ENA JO1 AB TITR SER: 0 AU/ML (ref 0–40)
FIBRILLARIN AB SER QL: NEGATIVE
KU AB SER QL: NEGATIVE
MDA5 AB SER QL LINE BLOT: NEGATIVE
MI2 AB SER QL: POSITIVE
MJ AB SER QL LINE BLOT: NEGATIVE
OJ AB SER QL: NEGATIVE
P155 140  ANTIBODY Q6011: NEGATIVE
PL12 AB SER QL: NEGATIVE
PL7 AB SER QL: NEGATIVE
PM/SCL-100 AB SER QL LINE BLOT: NEGATIVE
SAE1 AB SER QL LINE BLOT: NEGATIVE
SRP AB SERPL QL: NEGATIVE
SSA52 R0ENA AB IGG Q0420: 1 AU/ML (ref 0–40)
SSA60 R0ENA AB IGG Q0419: 7 AU/ML (ref 0–40)
TIF1-GAMMA AB SER QL LINE BLOT: NEGATIVE
U1 SNRNP IGG SER QL: 5 UNITS (ref 0–19)

## 2024-03-14 ENCOUNTER — HOSPITAL ENCOUNTER (OUTPATIENT)
Dept: RADIOLOGY | Facility: MEDICAL CENTER | Age: 44
End: 2024-03-14
Attending: INTERNAL MEDICINE
Payer: COMMERCIAL

## 2024-03-14 DIAGNOSIS — M25.50 POLYARTHRALGIA: ICD-10-CM

## 2024-03-14 PROCEDURE — 73218 MRI UPPER EXTREMITY W/O DYE: CPT | Mod: RT

## 2024-03-26 ENCOUNTER — OFFICE VISIT (OUTPATIENT)
Dept: RHEUMATOLOGY | Facility: MEDICAL CENTER | Age: 44
End: 2024-03-26
Attending: INTERNAL MEDICINE
Payer: COMMERCIAL

## 2024-03-26 VITALS
HEART RATE: 71 BPM | DIASTOLIC BLOOD PRESSURE: 62 MMHG | WEIGHT: 161 LBS | SYSTOLIC BLOOD PRESSURE: 100 MMHG | TEMPERATURE: 97.5 F | OXYGEN SATURATION: 97 % | RESPIRATION RATE: 12 BRPM | BODY MASS INDEX: 28.52 KG/M2

## 2024-03-26 DIAGNOSIS — M60.9 MYOSITIS, UNSPECIFIED MYOSITIS TYPE, UNSPECIFIED SITE: ICD-10-CM

## 2024-03-26 DIAGNOSIS — M79.7 FIBROMYALGIA: ICD-10-CM

## 2024-03-26 PROCEDURE — 99211 OFF/OP EST MAY X REQ PHY/QHP: CPT

## 2024-03-26 RX ORDER — PREGABALIN 75 MG/1
75 CAPSULE ORAL 3 TIMES DAILY
COMMUNITY
Start: 2024-02-19

## 2024-03-26 ASSESSMENT — PATIENT HEALTH QUESTIONNAIRE - PHQ9: CLINICAL INTERPRETATION OF PHQ2 SCORE: 0

## 2024-03-26 ASSESSMENT — FIBROSIS 4 INDEX: FIB4 SCORE: 0.67

## 2024-03-26 NOTE — PROGRESS NOTES
Chief Complaint- joint pain     Subjective:   Ozzy Do is a 44 y.o. female here today for follow up of rheumatological issues    This is a follow-up visit for this patient, third visit with us for this patient who was referred to us for evaluation of polyarthralgias, rash and weakness of upper extremities.  Patient status post evaluation by neurology who feels that patient does not have multiple sclerosis and subsequently referred to rheumatology for further evaluation.  We have done a number of serologies and patient was found to have a positive MI-2 compatible with a diagnosis of dermatomyositis.  Patient does complain of occasional rashes around her eyelids compatible heliotrope rash and also has a persistent rash on her chest which is not pruritic and nonpainful compatible with a shawl like rash.  Of note patient's CPK and aldolase levels have all been normal, patient also s/p EMG/NCV of bilateral lower extremities which was normal.       PMHx  Asthma  Tremors, possible seizures     Fam Hx  M-RA, fibromyalgia, depression  F-CAD, HTN  S-thrombocytopenia,      Soc Hx  Pos tobacco, quit 2013  ETOH 4-5/week  No blood tx  Positive tattoos  No IVDA  Patient works as a hairdresser        SSA neg 2/2024  SSB neg 2/2024  MEAGAN-1 neg 2/2024  PM/Scl 100 neg 2/2024       F actin neg 2/2024  AMA neg 2/2024  Thyroglobulin neg 2/2024  TPO neg 2/2024  MPO neg 9/2023  PR3 neg 9/2023  HLA B27 nrg 9/2023  dsDNA neg 9/2023  Bridges neg 9/2023  Scl-70 neh 9/2023  SSA neg 9/2023  SSB neg 9/2023  MEAGAN-1 neg 9/2023  Centromere neg 9/2023  RF neg 9/2023  CCP neg 9/2023  SSA neg 6/2023  SSB neg 6/2023  Lyme neg 6/2023  Uric acid 3.6 nl 9/2023  CPK 56 nl 9/2023  Aldolase 2.3 nl 9/2023     Hand x-rays 9/2023-IMPRESSION:  No erosive changes identified.     Feet x-rays 9/2023-IMPRESSION:  Minimal degenerative changes of the MTP joints. No erosive changes.     Left Knee x-ray 9/2023-IMPRESSION:  Mildly narrowed sclerotic medial knee joint  compartment. No erosive changes identified.     MRI Brain 4/2023-IMPRESSION:  MRI of the brain without contrast within normal limits for age with scant RIGHT frontal white matter changes.     MRI C spine 6/2023-IMPRESSION:  Contrast enhanced MRI of the cervical spine within normal limits.     MRI T spine 6/2023-IMPRESSION:  MRI of the thoracic spine without and with contrast within normal limits for age.     EMG/NCV Bilateral LE 9/2023-IMPRESSION:  This is a normal study of the lower extremities. There is no electrophysiologic evidence of a large fiber peripheral neuropathy, myopathy,  or left lumbosacral radiculopathy.   Carla Hunt MD  Neurology - Neurophysiology     Echocardiogram 4/2021-CONCLUSIONS  Normal echocardiogram study.                 Current Outpatient Medications   Medication Sig Dispense Refill    pregabalin (LYRICA) 75 MG Cap Take 75 mg by mouth 3 times a day.      Bacillus Coagulans-Inulin (PROBIOTIC) 1-250 BILLION-MG Cap       cetirizine (ZYRTEC) 10 MG Tab Take 10 mg by mouth 1 time a day as needed for Allergies.      fluoxetine (PROZAC) 40 MG capsule Take 40 mg by mouth every day.      MAXWELL 1/20 1-20 MG-MCG per tablet Take 1 Tab by mouth every day.      acyclovir (ZOVIRAX) 5 % Ointment        No current facility-administered medications for this visit.     She  has a past medical history of Asthma, Awareness under anesthesia, PONV (postoperative nausea and vomiting), and Psychiatric problem.    ROS   Other than what is mentioned in HPI or physical exam, there is no history of headaches, double vision or blurred vision.  No trouble swallowing difficulties .  No chest complaints including chest pain, cough or sputum production. No GI complaints including nausea, vomiting, change in bowel habits, or past peptic ulcer disease. No history of blood in the stools. No urinary complaints including dysuria or frequency. No history of alopecia, photosensitivity     Objective:     /62   Pulse 71    Temp 36.4 °C (97.5 °F) (Temporal)   Resp 12   Wt 73 kg (161 lb)   SpO2 97%  Body mass index is 28.52 kg/m².   Physical Exam:    Constitutional: Alert and oriented X3, patient is talkative with good eye contact.Skin: Warm, dry, good turgor, no rashes in visible areas, persistent erythematous nonpruritic nonraised rash along chest, no rashes along eyelids, no Gottron's papules noted.Eye: Equal, round and reactive, conjunctiva clear, lids normal EOM intactENMT: Lips without lesions,  pinna without deformityNeck: Trachea midline, no masses, no thyromegaly.Lymph:  No cervical lymphadenopathy, no axillary lymphadenopathy, no supraclavicular lymphadenopathyRespiratory: Unlabored respiratory effort, lungs clear to auscultation, no wheezes, no ronchi.Cardiovascular: Normal S1, S2, Regular rate and rhythm, no murmurs rubs or gallops  .Abdomen: Soft, non-distended.Psych: Alert and oriented x3, normal affect and mood.Neuro: Cranial nerves 2-12 are grossly intact Ext:no joint laxity noted in bilateral arms, no joint laxity noted in bilateral legs no dactylitis, no sausage digits, upper extremity strength is 4 out of 4, able to resist against pressure, patient able to stand from sitting without using her hands    Lab Results   Component Value Date/Time    SODIUM 136 09/05/2023 12:02 PM    POTASSIUM 4.0 09/05/2023 12:02 PM    CHLORIDE 103 09/05/2023 12:02 PM    CO2 22 09/05/2023 12:02 PM    GLUCOSE 85 09/05/2023 12:02 PM    BUN 15 09/05/2023 12:02 PM    CREATININE 0.92 09/05/2023 12:02 PM      Lab Results   Component Value Date/Time    WBC 7.1 09/05/2023 12:02 PM    RBC 3.99 (L) 09/05/2023 12:02 PM    HEMOGLOBIN 13.2 09/05/2023 12:02 PM    HEMATOCRIT 40.4 09/05/2023 12:02 PM    .3 (H) 09/05/2023 12:02 PM    MCH 33.1 (H) 09/05/2023 12:02 PM    MCHC 32.7 09/05/2023 12:02 PM    MPV 10.5 09/05/2023 12:02 PM    NEUTSPOLYS 61.50 09/05/2023 12:02 PM    LYMPHOCYTES 30.00 09/05/2023 12:02 PM    MONOCYTES 5.30 09/05/2023 12:02 PM     EOSINOPHILS 2.30 09/05/2023 12:02 PM    BASOPHILS 0.60 09/05/2023 12:02 PM      Lab Results   Component Value Date/Time    CALCIUM 9.1 09/05/2023 12:02 PM    ASTSGOT 15 09/05/2023 12:02 PM    ALTSGPT 14 09/05/2023 12:02 PM    ALKPHOSPHAT 53 09/05/2023 12:02 PM    TBILIRUBIN 0.3 09/05/2023 12:02 PM    ALBUMIN 4.5 09/05/2023 12:02 PM    TOTPROTEIN 7.4 09/05/2023 12:02 PM     Lab Results   Component Value Date/Time    URICACID 3.6 09/05/2023 12:02 PM    RHEUMFACTN <10 09/05/2023 12:02 PM     Lab Results   Component Value Date/Time    ANTIDNADS SEE BELOW 09/05/2023 12:02 PM    RNPAB 5 02/15/2024 01:15 PM    SMITHAB 2 09/05/2023 12:02 PM    CBUXXPZ62 1 09/05/2023 12:02 PM    CENTROMBAB 0 09/05/2023 12:02 PM     Lab Results   Component Value Date/Time    ANTIDNADS SEE BELOW 09/05/2023 12:02 PM    JO1AB 0 02/15/2024 01:15 PM    RNPAB 5 02/15/2024 01:15 PM    ANTISSBSJ 1 09/05/2023 12:02 PM     Lab Results   Component Value Date/Time    SEDRATEWES 9 09/05/2023 12:02 PM     Lab Results   Component Value Date/Time    HTQB50NWJN Negative 09/21/2023 10:27 AM     Lab Results   Component Value Date/Time    CPKTOTAL 56 09/05/2023 12:02 PM     Lab Results   Component Value Date/Time    MICROSOMALA 9.0 02/15/2024 01:15 PM    ANTITHYROGL <0.9 02/15/2024 01:15 PM     Lab Results   Component Value Date/Time    ANTIMITOCHO 9.5 02/15/2024 01:15 PM    FACTIN 7 02/15/2024 01:15 PM     Assessment and Plan:     1. 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  We did do standing orders for CPK and aldolase to do when  patient feels like her weakness may be exacerbated to check for elevations of CPKs and aldolase.  Currently all levels of CPKs and aldolase have been normal.  - CREATINE  KINASE; Standing  - ALDOLASE; Standing    2. Fibromyalgia  Recently diagnosed by [facility] currently on Lyrica managed by other physician    Followup: Return in about 6 months (around 9/26/2024). or sooner prn      Please note that this dictation was created using voice recognition software. I have made every reasonable attempt to correct obvious errors, but I expect that there are errors of grammar and possibly content that I did not discover before finalizing the note.

## 2024-04-02 ENCOUNTER — HOSPITAL ENCOUNTER (OUTPATIENT)
Dept: LAB | Facility: MEDICAL CENTER | Age: 44
End: 2024-04-02
Attending: INTERNAL MEDICINE
Payer: COMMERCIAL

## 2024-04-02 DIAGNOSIS — M60.9 MYOSITIS, UNSPECIFIED MYOSITIS TYPE, UNSPECIFIED SITE: ICD-10-CM

## 2024-04-02 LAB — CK SERPL-CCNC: 76 U/L (ref 0–154)

## 2024-04-02 PROCEDURE — 82550 ASSAY OF CK (CPK): CPT

## 2024-04-02 PROCEDURE — 36415 COLL VENOUS BLD VENIPUNCTURE: CPT

## 2024-04-02 PROCEDURE — 82085 ASSAY OF ALDOLASE: CPT

## 2024-04-04 LAB — ALDOLASE SERPL-CCNC: 2.4 U/L (ref 1.2–7.6)

## 2024-04-19 ENCOUNTER — OFFICE VISIT (OUTPATIENT)
Dept: BEHAVIORAL HEALTH | Facility: CLINIC | Age: 44
End: 2024-04-19
Payer: COMMERCIAL

## 2024-04-19 DIAGNOSIS — R41.840 INATTENTION: ICD-10-CM

## 2024-04-19 DIAGNOSIS — F33.40 RECURRENT MAJOR DEPRESSIVE DISORDER, IN REMISSION (HCC): ICD-10-CM

## 2024-04-19 DIAGNOSIS — F41.1 GENERALIZED ANXIETY DISORDER: ICD-10-CM

## 2024-04-19 PROCEDURE — 90792 PSYCH DIAG EVAL W/MED SRVCS: CPT | Performed by: PSYCHIATRY & NEUROLOGY

## 2024-04-19 RX ORDER — BUSPIRONE HYDROCHLORIDE 5 MG/1
5 TABLET ORAL 2 TIMES DAILY
Qty: 60 TABLET | Refills: 2 | Status: SHIPPED | OUTPATIENT
Start: 2024-04-19 | End: 2024-05-14

## 2024-04-19 NOTE — PROGRESS NOTES
"INITIAL PSYCHIATRIC EVALUATION    This provider informed the patient their medical records are totally confidential except for the use by other providers involved in their care, or if the patient signs a release, or to report instances of child or elder abuse, or if it is determined they are an immediate risk to harm themselves or others.    CHIEF COMPLAINT  \"I thought it was time to get refreshed\"    HISTORY OF PRESENT ILLNESS  Ozzy Do is a 44 y.o. old female who comes in today to establish care and for evaluation of depression and new anxiety in the setting of historical inattention and executive dysfunction.  There are no behavioral health notes to review.  Patient is new to the clinic.     Anxiety has     Patient has been taking Prozac 40 mg daily ***    PSYCHIATRIC REVIEW OF SYSTEMS: {Psych ROS:65988}    MEDICAL REVIEW OF SYSTEMS:   Constitutional {POSITIVE/NEGATIVE:14}   Eyes {POSITIVE/NEGATIVE:14}   Ears/Nose/Mouth/Throat {POSITIVE/NEGATIVE:14}   Cardiovascular {POSITIVE/NEGATIVE:14}   Respiratory {POSITIVE/NEGATIVE:14}   Gastrointestinal {POSITIVE/NEGATIVE:14}   Genitourinary {POSITIVE/NEGATIVE:14}   Muscular {POSITIVE/NEGATIVE:14}   Integumentary {POSITIVE/NEGATIVE:14}   Neurological {POSITIVE/NEGATIVE:14}   Endocrine {POSITIVE/NEGATIVE:14}   Hematologic/Lymphatic {POSITIVE/NEGATIVE:14}     CURRENT MEDICATIONS:  Current Outpatient Medications   Medication Sig Dispense Refill    pregabalin (LYRICA) 75 MG Cap Take 75 mg by mouth 3 times a day.      acyclovir (ZOVIRAX) 5 % Ointment       Bacillus Coagulans-Inulin (PROBIOTIC) 1-250 BILLION-MG Cap       cetirizine (ZYRTEC) 10 MG Tab Take 10 mg by mouth 1 time a day as needed for Allergies.      fluoxetine (PROZAC) 40 MG capsule Take 40 mg by mouth every day.      MAXWELL 1/20 1-20 MG-MCG per tablet Take 1 Tab by mouth every day.       No current facility-administered medications for this visit.     ALLERGIES:  Tape and Ceclor [cefaclor]    PAST " PSYCHIATRIC HISTORY  Prior psychiatric hospitalization: ***  Prior Self harm/suicide attempt: ***  Prior Diagnosis: ***  Outpatient: Started therapy with Jackie Godwin at [facility] 1 week ago, will be going biweekly.    PAST PSYCHIATRIC MEDICATIONS  Prozac at varying doses     FAMILY HISTORY  Psychiatric diagnosis:  maternal grandma with possible bipolar disorder, aunt with depression, mom with possible depression and anxiety and significant mood reactivity, dad with ADHD, sister with depression  History of suicide attempts:  yes, maternal aunt  by suicide  Substance abuse history:  maternal grandfather with alcohol use disorder    SUBSTANCE USE HISTORY:  ALCOHOL: 1-2 glasses of wine with dinner 2-3 nights per week, 3-4 drinks socially one night per week  TOBACCO: Former smoker, quit at age 34  CANNABIS: Very rare use of THC in liquid form.  OPIOIDS: Denies.  PRESCRIPTION MEDICATIONS: Denies.  OTHERS: 10 oz coffee per day  History of inpatient/outpatient rehab treatment: Denies/denies.    SOCIAL HISTORY  Childhood: Born in *** and describes childhood as ***  Education: [employer] for 2 semesters. Then [employer] and various certifications   in Special Education: No.   Intellectual Disability: No.  Employment: ***  Relationship: ***  Kids: ***  Current living situation: ***  Current/past legal issues: ***  History of emotional/physical/sexual abuse: ***   History: ***  Spiritual/Hinduism affiliation: ***    MEDICAL HISTORY  Past Medical History:   Diagnosis Date    Asthma     Awareness under anesthesia     PONV (postoperative nausea and vomiting)     Psychiatric problem     Depression     Also with hx of dermatomyositis (following with rheumatology) and fibromyalgia (following with neurology).  *** hx of brain injury, seizures, stroke, cardiac arrhythmia, DM, HTN. Head injury with fractured skull as a toddler. Dad with HCM.    Past Surgical History:   Procedure Laterality Date    PB KNEE  SCOPE,DIAGNOSTIC Left 2021    Procedure: ARTHROSCOPY, KNEE;  Surgeon: Rick Christensen M.D.;  Location: SURGERY [facility];  Service: Orthopedics    PB KNEE SCOPE,MED/LAT MENISECTOMY Left 2021    Procedure: MENISCECTOMY, KNEE, MEDIAL;  Surgeon: Rick Christensen M.D.;  Location: SURGERY [facility];  Service: Orthopedics    OTHER ORTHOPEDIC SURGERY Right 1997    Shoulder    OTHER      Tooth extraction X1     PHYSICAL EXAMINAION:  Vital signs: There were no vitals taken for this visit.  Musculoskeletal: Normal gait.   Abnormal movements: ***    MENTAL STATUS EXAMINATION    General:   - Grooming and hygiene: {[facility] GROOMIN},   - Apparent distress: ***,   - Behavior: {[facility] BEHAVIOR:58579139}  - Eye Contact:  {[facility] EYE CONTACT:86009598},   - no psychomotor agitation or retardation ***   - Participation: {[facility] PARTICIPATION MEASURES:64466810}  Orientation: {[facility] :97203833} to person, place and time  Mood: {[facility] MOOD:73128822}  Affect: {[facility] AFFECT:73406093},  Thought Process: {[facility] THOUGHT PROCESS:95424888}  Thought Content: Denies suicidal or homicidal ideations, intent or plan {[facility] THOUGHT CONTENT:39790033}  Perception: Denies auditory or visual hallucinations. No delusions noted {[facility] PERCEPTION:26171941}  Attention span and concentration: Intact ***  Speech:{[facility] SPEECH:19927405}  Language: Appropriate ***  Insight: {GOOD/ADEQUATE/LIMITED/POOR:45127365}  Judgment: {GOOD/ADEQUATE/LIMITED/POOR:82411622}  Recent and remote memory: {[facility] MEMORY:09094603}    DEPRESSION SCREENIN/19/2023     2:20 PM 3/26/2024    10:15 AM   Depression Screen (PHQ-2/PHQ-9)   PHQ-2 Total Score 0 0   Interpretation of PHQ-9 Total Score   Score Severity   1-4 No Depression   5-9 Mild Depression   10-14 Moderate Depression   15-19 Moderately Severe Depression   20-27 Severe Depression    ANXIETY SCREENING:       No data to display            Interpretation of [place] 7 Total Score   Score Severity:  0-4 No Anxiety  "  5-9 Mild Anxiety  10-14 Moderate Anxiety  15-21 Severe Anxiety     SAFETY ASSESSMENT - SELF:  Does patient acknowledge current or past symptoms of dangerousness to self? ***  History of suicide by family member: ***  History of suicide by friend/significant other: ***  Recent change in amount/specificity/intensity of suicidal thoughts or self-harm behavior? ***  Current access to firearms, medications, or other identified means of suicide/self-harm? ***  If yes, willing to restrict access to means of suicide/self-harm? ***  Protective factors present: ***     SAFETY ASSESSMENT - OTHERS:  Does patient acknowledge current or past symptoms of aggressive behavior or risk to others? ***  Recent change in amount/specificity/intensity of thoughts or threats to harm others? ***  Current access to firearms/other identified means of harm? ***  If yes, willing to restrict access to weapons/means of harm? ***     CURRENT RISK:       Suicidal: {[facility] RATINGS:84792658}       Homicidal: {[facility] RATINGS:02169473}       Self-Harm: {[facility] RATINGS:68735640}       Relapse: {[facility] RATINGS:25260868}       Crisis Safety Plan Reviewed {YES/NO/NOT INDICATED:61975}    MEDICAL RECORDS/LABS/DIAGNOSTIC TESTS REVIEWED:  No results found for: \"CHOLSTRLTOT\", \"LDL\", \"HDL\", \"TRIGLYCERIDE\"    Lab Results   Component Value Date/Time    SODIUM 136 09/05/2023 12:02 PM    POTASSIUM 4.0 09/05/2023 12:02 PM    CHLORIDE 103 09/05/2023 12:02 PM    CO2 22 09/05/2023 12:02 PM    GLUCOSE 85 09/05/2023 12:02 PM    BUN 15 09/05/2023 12:02 PM    CREATININE 0.92 09/05/2023 12:02 PM     Lab Results   Component Value Date/Time    ALKPHOSPHAT 53 09/05/2023 12:02 PM    ASTSGOT 15 09/05/2023 12:02 PM    ALTSGPT 14 09/05/2023 12:02 PM    TBILIRUBIN 0.3 09/05/2023 12:02 PM       Lab Results   Component Value Date/Time    WBC 7.1 09/05/2023 12:02 PM    RBC 3.99 (L) 09/05/2023 12:02 PM    HEMOGLOBIN 13.2 09/05/2023 12:02 PM    HEMATOCRIT 40.4 09/05/2023 12:02 PM    .3 (H) " 09/05/2023 12:02 PM    MCH 33.1 (H) 09/05/2023 12:02 PM    MCHC 32.7 09/05/2023 12:02 PM    MPV 10.5 09/05/2023 12:02 PM    NEUTSPOLYS 61.50 09/05/2023 12:02 PM    LYMPHOCYTES 30.00 09/05/2023 12:02 PM    MONOCYTES 5.30 09/05/2023 12:02 PM    EOSINOPHILS 2.30 09/05/2023 12:02 PM    BASOPHILS 0.60 09/05/2023 12:02 PM       TSH and free T4 from 9/5/23 WNL. eGFR of 80 at that time.    [place]  records -   Reviewed; no concerns.    DIFFERENTIAL DIAGNOSES  Generalized anxiety disorder  Recurrent major depressive disorder, in remission  Rule-out PTSD  Inattention    ***    PLAN:  Continue Prozac 40 mg daily.  Start BuSpar 5 mg twice daily.   Continue psychotherapy.     Medication options, alternatives (including no medications) and medication risks/benefits/side effects were discussed in detail.  Explained importance of contraceptive measures while on psychotropic medications, educated to let provider know if ever pregnant or wanting to become pregnant. Verbalized understanding.  The patient was advised to call, message provider on scroll kit, or come in to the clinic if symptoms worsen or if any future questions/issues regarding their medications arise; the patient verbalized understanding and agreement.    The patient was educated to call 911, call the suicide hotline, or go to local ER if having thoughts of suicide or homicide; verbalized understanding.    Return to clinic in 4-6 weeks or sooner if symptoms worsen.  Next Appointment:  instruction provided on how to make the next appointment.     The proposed treatment plan was discussed with the patient who was provided the opportunity to ask questions and make suggestions regarding alternative treatment. Patient verbalized understanding and expressed agreement with the plan.     Thank you for allowing me to participate in the care of this patient.    Carolina Trejo M.D.  04/19/24    CC:   Jackie Hansen M.D.    This note was created using voice recognition software  (Kell). The accuracy of the dictation is limited by the abilities of the software. I have reviewed the note prior to signing, however some errors in grammar and context are still possible. If you have any questions related to this note please do not hesitate to contact our office.     alternatives (including no medications) and medication risks/benefits/side effects were discussed in detail.  Explained importance of contraceptive measures while on psychotropic medications, educated to let provider know if ever pregnant or wanting to become pregnant. Verbalized understanding.  The patient was advised to call, message provider on MyChart, or come in to the clinic if symptoms worsen or if any future questions/issues regarding their medications arise; the patient verbalized understanding and agreement.    The patient was educated to call 911, call the suicide hotline, or go to local ER if having thoughts of suicide or homicide; verbalized understanding.    Return to clinic in 4-6 weeks or sooner if symptoms worsen.  Next Appointment:  instruction provided on how to make the next appointment.     The proposed treatment plan was discussed with the patient who was provided the opportunity to ask questions and make suggestions regarding alternative treatment. Patient verbalized understanding and expressed agreement with the plan.     Thank you for allowing me to participate in the care of this patient.    Carolina Trejo M.D.  04/19/24    CC:   Jackie Hansen M.D.    This note was created using voice recognition software (Dragon). The accuracy of the dictation is limited by the abilities of the software. I have reviewed the note prior to signing, however some errors in grammar and context are still possible. If you have any questions related to this note please do not hesitate to contact our office.

## 2024-05-02 ENCOUNTER — APPOINTMENT (OUTPATIENT)
Dept: ADMISSIONS | Facility: MEDICAL CENTER | Age: 44
End: 2024-05-02
Attending: SURGERY
Payer: COMMERCIAL

## 2024-05-07 ENCOUNTER — PRE-ADMISSION TESTING (OUTPATIENT)
Dept: ADMISSIONS | Facility: MEDICAL CENTER | Age: 44
End: 2024-05-07
Attending: SURGERY
Payer: COMMERCIAL

## 2024-05-07 RX ORDER — COVID-19 ANTIGEN TEST
220-440 KIT MISCELLANEOUS 2 TIMES DAILY PRN
COMMUNITY

## 2024-05-07 NOTE — PREPROCEDURE INSTRUCTIONS
Med instructions given to patient per Guidelines for Pre-operative Medication Management. Medication list with written instructions emailed to pt email address on file.    Pre-procedure packet reviewed with patient. Patient to follow fasting guidelines/bathing instructions given per anesthesia/surgeon. Patient verbalized understanding of all instructions.     Patient to have HCG done per anesthesia protocol on DOS.    Patient has no further questions at this time.

## 2024-05-14 RX ORDER — BUSPIRONE HYDROCHLORIDE 5 MG/1
5 TABLET ORAL 2 TIMES DAILY
Qty: 180 TABLET | Refills: 1 | Status: SHIPPED | OUTPATIENT
Start: 2024-05-14 | End: 2024-05-20 | Stop reason: DRUGHIGH

## 2024-05-16 ENCOUNTER — ANESTHESIA EVENT (OUTPATIENT)
Dept: SURGERY | Facility: MEDICAL CENTER | Age: 44
End: 2024-05-16
Payer: COMMERCIAL

## 2024-05-16 ENCOUNTER — HOSPITAL ENCOUNTER (OUTPATIENT)
Facility: MEDICAL CENTER | Age: 44
End: 2024-05-16
Attending: SURGERY | Admitting: SURGERY
Payer: COMMERCIAL

## 2024-05-16 ENCOUNTER — ANESTHESIA (OUTPATIENT)
Dept: SURGERY | Facility: MEDICAL CENTER | Age: 44
End: 2024-05-16
Payer: COMMERCIAL

## 2024-05-16 VITALS
BODY MASS INDEX: 29.02 KG/M2 | TEMPERATURE: 97.1 F | HEIGHT: 63 IN | WEIGHT: 163.8 LBS | DIASTOLIC BLOOD PRESSURE: 75 MMHG | HEART RATE: 69 BPM | SYSTOLIC BLOOD PRESSURE: 135 MMHG | RESPIRATION RATE: 12 BRPM | OXYGEN SATURATION: 97 %

## 2024-05-16 LAB
HCG UR QL: NEGATIVE
PATHOLOGY CONSULT NOTE: NORMAL

## 2024-05-16 RX ORDER — SODIUM CHLORIDE, SODIUM LACTATE, POTASSIUM CHLORIDE, CALCIUM CHLORIDE 600; 310; 30; 20 MG/100ML; MG/100ML; MG/100ML; MG/100ML
INJECTION, SOLUTION INTRAVENOUS CONTINUOUS
Status: DISCONTINUED | OUTPATIENT
Start: 2024-05-16 | End: 2024-05-16 | Stop reason: HOSPADM

## 2024-05-16 RX ORDER — HYDRALAZINE HYDROCHLORIDE 20 MG/ML
5 INJECTION INTRAMUSCULAR; INTRAVENOUS
Status: DISCONTINUED | OUTPATIENT
Start: 2024-05-16 | End: 2024-05-16 | Stop reason: HOSPADM

## 2024-05-16 RX ORDER — HYDROMORPHONE HYDROCHLORIDE 1 MG/ML
0.4 INJECTION, SOLUTION INTRAMUSCULAR; INTRAVENOUS; SUBCUTANEOUS
Status: DISCONTINUED | OUTPATIENT
Start: 2024-05-16 | End: 2024-05-16 | Stop reason: HOSPADM

## 2024-05-16 RX ORDER — MEPERIDINE HYDROCHLORIDE 25 MG/ML
12.5 INJECTION INTRAMUSCULAR; INTRAVENOUS; SUBCUTANEOUS
Status: DISCONTINUED | OUTPATIENT
Start: 2024-05-16 | End: 2024-05-16 | Stop reason: HOSPADM

## 2024-05-16 RX ORDER — SODIUM CHLORIDE 9 MG/ML
INJECTION, SOLUTION INTRAVENOUS CONTINUOUS
Status: DISCONTINUED | OUTPATIENT
Start: 2024-05-16 | End: 2024-05-16 | Stop reason: HOSPADM

## 2024-05-16 RX ORDER — BUPIVACAINE HYDROCHLORIDE 2.5 MG/ML
INJECTION, SOLUTION EPIDURAL; INFILTRATION; INTRACAUDAL
Status: DISCONTINUED | OUTPATIENT
Start: 2024-05-16 | End: 2024-05-16 | Stop reason: HOSPADM

## 2024-05-16 RX ORDER — DIPHENHYDRAMINE HYDROCHLORIDE 50 MG/ML
12.5 INJECTION INTRAMUSCULAR; INTRAVENOUS
Status: DISCONTINUED | OUTPATIENT
Start: 2024-05-16 | End: 2024-05-16 | Stop reason: HOSPADM

## 2024-05-16 RX ORDER — HYDROMORPHONE HYDROCHLORIDE 1 MG/ML
0.2 INJECTION, SOLUTION INTRAMUSCULAR; INTRAVENOUS; SUBCUTANEOUS
Status: DISCONTINUED | OUTPATIENT
Start: 2024-05-16 | End: 2024-05-16 | Stop reason: HOSPADM

## 2024-05-16 RX ORDER — HYDROMORPHONE HYDROCHLORIDE 1 MG/ML
0.5 INJECTION, SOLUTION INTRAMUSCULAR; INTRAVENOUS; SUBCUTANEOUS
Status: DISCONTINUED | OUTPATIENT
Start: 2024-05-16 | End: 2024-05-16 | Stop reason: HOSPADM

## 2024-05-16 RX ORDER — MIDAZOLAM HYDROCHLORIDE 1 MG/ML
INJECTION INTRAMUSCULAR; INTRAVENOUS PRN
Status: DISCONTINUED | OUTPATIENT
Start: 2024-05-16 | End: 2024-05-16 | Stop reason: SURG

## 2024-05-16 RX ORDER — LIDOCAINE HYDROCHLORIDE 20 MG/ML
INJECTION, SOLUTION EPIDURAL; INFILTRATION; INTRACAUDAL; PERINEURAL PRN
Status: DISCONTINUED | OUTPATIENT
Start: 2024-05-16 | End: 2024-05-16 | Stop reason: SURG

## 2024-05-16 RX ORDER — EPHEDRINE SULFATE 50 MG/ML
5 INJECTION, SOLUTION INTRAVENOUS
Status: DISCONTINUED | OUTPATIENT
Start: 2024-05-16 | End: 2024-05-16 | Stop reason: HOSPADM

## 2024-05-16 RX ORDER — OXYCODONE HCL 5 MG/5 ML
10 SOLUTION, ORAL ORAL
Status: COMPLETED | OUTPATIENT
Start: 2024-05-16 | End: 2024-05-16

## 2024-05-16 RX ORDER — CEFAZOLIN SODIUM 1 G/3ML
INJECTION, POWDER, FOR SOLUTION INTRAMUSCULAR; INTRAVENOUS PRN
Status: DISCONTINUED | OUTPATIENT
Start: 2024-05-16 | End: 2024-05-16 | Stop reason: SURG

## 2024-05-16 RX ORDER — HALOPERIDOL 5 MG/ML
1 INJECTION INTRAMUSCULAR
Status: DISCONTINUED | OUTPATIENT
Start: 2024-05-16 | End: 2024-05-16 | Stop reason: HOSPADM

## 2024-05-16 RX ORDER — ONDANSETRON 2 MG/ML
INJECTION INTRAMUSCULAR; INTRAVENOUS PRN
Status: DISCONTINUED | OUTPATIENT
Start: 2024-05-16 | End: 2024-05-16 | Stop reason: SURG

## 2024-05-16 RX ORDER — OXYCODONE HCL 5 MG/5 ML
5 SOLUTION, ORAL ORAL
Status: COMPLETED | OUTPATIENT
Start: 2024-05-16 | End: 2024-05-16

## 2024-05-16 RX ORDER — DEXAMETHASONE SODIUM PHOSPHATE 4 MG/ML
INJECTION, SOLUTION INTRA-ARTICULAR; INTRALESIONAL; INTRAMUSCULAR; INTRAVENOUS; SOFT TISSUE PRN
Status: DISCONTINUED | OUTPATIENT
Start: 2024-05-16 | End: 2024-05-16 | Stop reason: SURG

## 2024-05-16 RX ADMIN — FENTANYL CITRATE 50 MCG: 50 INJECTION, SOLUTION INTRAMUSCULAR; INTRAVENOUS at 09:50

## 2024-05-16 RX ADMIN — SODIUM CHLORIDE, POTASSIUM CHLORIDE, SODIUM LACTATE AND CALCIUM CHLORIDE: 600; 310; 30; 20 INJECTION, SOLUTION INTRAVENOUS at 09:39

## 2024-05-16 RX ADMIN — LIDOCAINE HYDROCHLORIDE 80 MG: 20 INJECTION, SOLUTION EPIDURAL; INFILTRATION; INTRACAUDAL at 09:50

## 2024-05-16 RX ADMIN — DEXAMETHASONE SODIUM PHOSPHATE 4 MG: 4 INJECTION INTRA-ARTICULAR; INTRALESIONAL; INTRAMUSCULAR; INTRAVENOUS; SOFT TISSUE at 09:52

## 2024-05-16 RX ADMIN — FENTANYL CITRATE 50 MCG: 50 INJECTION, SOLUTION INTRAMUSCULAR; INTRAVENOUS at 10:36

## 2024-05-16 RX ADMIN — PROPOFOL 150 MG: 10 INJECTION, EMULSION INTRAVENOUS at 09:50

## 2024-05-16 RX ADMIN — ONDANSETRON 4 MG: 2 INJECTION INTRAMUSCULAR; INTRAVENOUS at 10:01

## 2024-05-16 RX ADMIN — CEFAZOLIN 2 G: 1 INJECTION, POWDER, FOR SOLUTION INTRAMUSCULAR; INTRAVENOUS at 09:52

## 2024-05-16 RX ADMIN — FENTANYL CITRATE 50 MCG: 50 INJECTION, SOLUTION INTRAMUSCULAR; INTRAVENOUS at 10:31

## 2024-05-16 RX ADMIN — OXYCODONE HYDROCHLORIDE 10 MG: 5 SOLUTION ORAL at 10:31

## 2024-05-16 RX ADMIN — MIDAZOLAM HYDROCHLORIDE 2 MG: 1 INJECTION, SOLUTION INTRAMUSCULAR; INTRAVENOUS at 09:46

## 2024-05-16 ASSESSMENT — FIBROSIS 4 INDEX: FIB4 SCORE: 0.67

## 2024-05-16 ASSESSMENT — PAIN DESCRIPTION - PAIN TYPE: TYPE: SURGICAL PAIN

## 2024-05-16 NOTE — ANESTHESIA TIME REPORT
Anesthesia Start and Stop Event Times       Date Time Event    5/16/2024 0934 Ready for Procedure     0946 Anesthesia Start     1020 Anesthesia Stop          Responsible Staff  05/16/24      Name Role Begin End    Sid Mario M.D. Anesth 0946 1020          Overtime Reason:  no overtime (within assigned shift)    Comments:

## 2024-05-16 NOTE — DISCHARGE INSTRUCTIONS
HOME CARE INSTRUCTIONS    ACTIVITY: Rest and take it easy for the first 24 hours.  A responsible adult is recommended to remain with you during that time.  It is normal to feel sleepy.  We encourage you to not do anything that requires balance, judgment or coordination.    FOR 24 HOURS DO NOT:  Drive, operate machinery or run household appliances.  Drink beer or alcoholic beverages.  Make important decisions or sign legal documents.    SPECIAL INSTRUCTIONS:     Muscle Biopsy, Care After  The following information offers guidance on how to care for yourself after your procedure. Your health care provider may also give you more specific instructions. If you have problems or questions, contact your health care provider.  What can I expect after the procedure?  After your procedure, it is common to have soreness and tenderness at the site of the biopsy. This may last for a few days.  Follow these instructions at home:  Biopsy site care  Follow instructions from your health care provider about how to take care of your biopsy site. Make sure you:  Wash your hands with soap and water for at least 20 seconds before and after you change your bandage (dressing). If soap and water are not available, use hand .  Change your dressing as told by your health care provider.  Leave stitches (sutures), skin glue, or adhesive strips in place. These skin closures may need to stay in place for 2 weeks or longer. If adhesive strip edges start to loosen and curl up, you may trim the loose edges. Do not remove adhesive strips completely unless your health care provider tells you to do that.  Check your biopsy site every day for signs of infection. Check for:  Redness, swelling, or pain.  Fluid or blood.  Warmth.  Pus or a bad smell.  Activity  If you were given a sedative during the procedure, it can affect you for several hours. Do not drive or operate machinery until your health care provider says that it is safe.  Return to  your normal activities as told by your health care provider. Ask your health care provider what activities are safe for you.  General instructions  Take over-the-counter and prescription medicines only as told by your health care provider.  Do not take baths, swim, or use a hot tub until your health care provider approves. Ask your health care provider if you may take showers. You may only be allowed to take sponge baths.  Keep all follow-up visits. This is important.  Contact a health care provider if:  You have redness, swelling, or pain around your biopsy site.  You have fluid or blood coming from your biopsy site.  Your biopsy site feels warm to the touch.  You have pus or a bad smell coming from your biopsy site.  You have a fever.  You are light-headed, or you feel like you will faint.  Get help right away if:  You have trouble breathing.  You have numbness or tingling going down the arm or leg of your biopsy site.  These symptoms may be an emergency. Get help right away. Call 911.  Do not wait to see if the symptoms will go away.  Do not drive yourself to the hospital.  Summary  After your procedure, it is common to have soreness and tenderness at the site of the biopsy.  Wash your hands before and after you change your dressing. Leave stitches, skin glue, or adhesive strips in place.  Return to your normal activities as told by your health care provider.  Contact a health care provider if you have redness, swelling, or pain around your biopsy site.  Get help right away if you have trouble breathing or have numbness and tingling down your arm or leg.  This information is not intended to replace advice given to you by your health care provider. Make sure you discuss any questions you have with your health care provider.  Document Revised: 11/08/2022 Document Reviewed: 11/08/2022  [employer] Patient Education © 2023 [employer]      DIET: To avoid nausea, slowly advance diet as tolerated, avoiding spicy or  greasy foods for the first day.  Add more substantial food to your diet according to your physician's instructions.  Babies can be fed formula or breast milk as soon as they are hungry.  INCREASE FLUIDS AND FIBER TO AVOID CONSTIPATION.    SURGICAL DRESSING/BATHING: May remove dressing on Saturday May 18th, no submerging in water for 1 week (No baths, pools or hot tubs). Showering is okay.    MEDICATIONS: Resume taking daily medication.  Take prescribed pain medication with food.  If no medication is prescribed, you may take non-aspirin pain medication if needed.  PAIN MEDICATION CAN BE VERY CONSTIPATING.  Take a stool softener or laxative such as senokot, pericolace, or milk of magnesia if needed.    Last pain medication given at 10:30 am    A follow-up appointment should be arranged with your doctor in 1-2 weeks ; call to schedule.    You should CALL YOUR PHYSICIAN if you develop:  Fever greater than 101 degrees F.  Pain not relieved by medication, or persistent nausea or vomiting.  Excessive bleeding (blood soaking through dressing) or unexpected drainage from the wound.  Extreme redness or swelling around the incision site, drainage of pus or foul smelling drainage.  Inability to urinate or empty your bladder within 8 hours.  Problems with breathing or chest pain.    You should call 911 if you develop problems with breathing or chest pain.  If you are unable to contact your doctor or surgical center, you should go to the nearest emergency room or urgent care center.  Physician's telephone #: Dr Benedict [phone]     MILD FLU-LIKE SYMPTOMS ARE NORMAL.  YOU MAY EXPERIENCE GENERALIZED MUSCLE ACHES, THROAT IRRITATION, HEADACHE AND/OR SOME NAUSEA.    If any questions arise, call your doctor.  If your doctor is not available, please feel free to call the Surgical Center at [phone].  The Center is open Monday through Friday from 7AM to 7PM.      A registered nurse may call you a few days after your surgery to  see how you are doing after your procedure.    You may also receive a survey in the mail within the next two weeks and we ask that you take a few moments to complete the survey and return it to us.  Our goal is to provide you with very good care and we value your comments.     Depression / Suicide Risk    As you are discharged from this [facility] facility, it is important to learn how to keep safe from harming yourself.    Recognize the warning signs:  Abrupt changes in personality, positive or negative- including increase in energy   Giving away possessions  Change in eating patterns- significant weight changes-  positive or negative  Change in sleeping patterns- unable to sleep or sleeping all the time   Unwillingness or inability to communicate  Depression  Unusual sadness, discouragement and loneliness  Talk of wanting to die  Neglect of personal appearance   Rebelliousness- reckless behavior  Withdrawal from people/activities they love  Confusion- inability to concentrate     If you or a loved one observes any of these behaviors or has concerns about self-harm, here's what you can do:  Talk about it- your feelings and reasons for harming yourself  Remove any means that you might use to hurt yourself (examples: pills, rope, extension cords, firearm)  Get professional help from the community (Mental Health, Substance Abuse, psychological counseling)  Do not be alone:Call your Safe Contact- someone whom you trust who will be there for you.  Call your local CRISIS HOTLINE 027-9375 or 142-176-2861  Call your local Children's Mobile Crisis Response Team [place] (211) 001-2200 or www.[employer]  Call the toll free National Suicide Prevention Hotlines   National Suicide Prevention Lifeline 615-616-OAUC (2282)  [place] [facility] 800-SUICIDE (139-2165)    I acknowledge receipt and understanding of these Home Care instructions.

## 2024-05-16 NOTE — OP REPORT
DATE OF SERVICE:  05/16/2024     PREOPERATIVE DIAGNOSIS:  Muscle weakness.     POSTOPERATIVE DIAGNOSIS:  Muscle weakness.     PROCEDURE:  Biopsy of the right quadriceps muscle.     SURGEON:  Shae Hunter MD     ASSISTANT:  ERON Foster     ANESTHESIA:  Laryngeal mask.     ANESTHESIOLOGIST:  Sid Mario MD     INDICATIONS:  The patient is a 44-year-old female who presented with a   worsening upper and lower extremity muscle weakness that she has had extensive   workup.  Her CPK has always been normal.  An EMG of the bilateral lower   extremities was normal, but her weakness seems to be mostly in the right upper   leg.  She is being brought at this time for biopsy of her right quadriceps   muscle.     FINDINGS:  Normal-appearing muscle, a 1 cm3 biopsy was taken of the muscle and   sent to pathology.     DESCRIPTION OF PROCEDURE:  After the patient was identified and consented, she   was brought to the operating room and placed in supine position.  The patient   underwent laryngeal mask anesthetic clearance.  The patient's right thigh was   prepped and draped in sterile fashion.  A longitudinal incision was made   using electrocautery, subcutaneous tissue was dissected down to the fascia   that was sharply entered.  The muscle was then cut sharply and sent to   pathology for evaluation.  Once the specimen had been sent, the wound was then   irrigated and hemostasis occurred.  It was closed with 3-0 Vicryl for the   fascia as well as subcutaneous skin tissue.  Skin was closed with a 4-0 Vicryl   in subcuticular fashion.  Steri-Strip and dry dressing placed on the wound.    The patient was extubated and taken to recovery room in stable condition.  All   sponge and needle counts were correct.        ______________________________  SHAE HUNTER MD    [facility]/SHAYE      DD:  05/16/2024 10:05  DT:  05/16/2024 10:45    Job#:  [id number]    CC:Jackie Hansen MD

## 2024-05-16 NOTE — OR NURSING
1015-Pt arrived from OR, resting calmly with even, unlabored breathing, vss, no pain, no nausea, site CDI and soft to palpation.    1045-RN contacted pt's  and provided updates, all questions answered.    1059-RN called report to [facility]    1103-Pt transferred in [facility] with RN to Phase II.  Vss, no pain, no nausea, site CDI and soft to palpation.

## 2024-05-16 NOTE — ANESTHESIA PREPROCEDURE EVALUATION
Case: [id number] Date/Time: 24    Procedure: MUSCLE BIOPSY - RIGHT QUADRICEPS    Pre-op diagnosis: MUSCLE WEAKNESS OF LIMB    Location: [facility] OR 08 / SURGERY [facility]    Surgeons: Shae Benedict M.D.            Relevant Problems   ANESTHESIA   (positive) PONV (postoperative nausea and vomiting)      PULMONARY   (positive) Asthma      NEURO   (positive) Seizure (HCC)      Other   (positive) Abnormal brain MRI   (positive) Family history of hypertrophic cardiomyopathy   (positive) Paresthesias   (positive) Vasovagal episode   (positive) Weakness of both lower extremities     Anes H&P:  PAST MEDICAL HISTORY:   44 y.o. female who presents for Procedure(s):  MUSCLE BIOPSY - RIGHT QUADRICEPS.  She has current and past medical problems significant for:    Past Medical History:   Diagnosis Date    Anesthesia     Asthma     not a current problem    Awareness under anesthesia     Pain 10/2022    PONV (postoperative nausea and vomiting)     Psychiatric problem     Depression    Sleep apnea Recent    no sleep study yet-in the process to get a sleep study       SMOKING/ALCOHOL/RECREATIONAL DRUG USE:  Social History     Tobacco Use    Smoking status: Former     Current packs/day: 0.00     Types: Cigarettes, Electronic Cigarettes     Start date: 1996     Quit date: 2016     Years since quittin.8    Smokeless tobacco: Never    Tobacco comments:     Off and on. Not consistent. Mostly social   Vaping Use    Vaping status: Former    Substances: Nicotine   Substance Use Topics    Alcohol use: Yes     Alcohol/week: 3.6 oz     Types: 6 Glasses of wine per week     Comment: Occasionally    Drug use: Yes     Types: Oral     Comment: THC-rare     Social History     Substance and Sexual Activity   Drug Use Yes    Types: Oral    Comment: THC-rare       PAST SURGICAL HISTORY:  Past Surgical History:   Procedure Laterality Date    PB KNEE SCOPE,DIAGNOSTIC Left 2021    Procedure: ARTHROSCOPY, KNEE;  Surgeon: Rick  "LATA Christensen M.D.;  Location: SURGERY [facility];  Service: Orthopedics    PB KNEE SCOPE,MED/LAT MENISECTOMY Left 07/28/2021    Procedure: MENISCECTOMY, KNEE, MEDIAL;  Surgeon: Rick Christensen M.D.;  Location: SURGERY [facility];  Service: Orthopedics    OTHER ORTHOPEDIC SURGERY Right 1997    Shoulder    OTHER      Tooth extraction X1    OTHER      sinus surgery       ALLERGIES:   Allergies   Allergen Reactions    Tape Rash     \"Burns and skin peels off with bandaids and regular tape\" Paper tape ok      Ceclor [Cefaclor] Rash     Childhood reaction         MEDICATIONS:  No current facility-administered medications on file prior to encounter.     Current Outpatient Medications on File Prior to Encounter   Medication Sig Dispense Refill    pregabalin (LYRICA) 75 MG Cap Take 150 mg by mouth 3 times a day.      Bacillus Coagulans-Inulin (PROBIOTIC) 1-250 BILLION-MG Cap       fluoxetine (PROZAC) 40 MG capsule Take 40 mg by mouth every day.      MAXWELL 1/20 1-20 MG-MCG per tablet Take 1 Tab by mouth every day.         LABS:  Lab Results   Component Value Date/Time    HEMOGLOBIN 13.2 09/05/2023 1202    HEMATOCRIT 40.4 09/05/2023 1202    WBC 7.1 09/05/2023 1202     Lab Results   Component Value Date/Time    SODIUM 136 09/05/2023 1202    POTASSIUM 4.0 09/05/2023 1202    CHLORIDE 103 09/05/2023 1202    CO2 22 09/05/2023 1202    GLUCOSE 85 09/05/2023 1202    BUN 15 09/05/2023 1202    CALCIUM 9.1 09/05/2023 1202         PREVIOUS ANESTHETICS: See EMR  __________________________________________      Physical Exam    Airway   Mallampati: II  TM distance: >3 FB  Neck ROM: full       Cardiovascular - normal exam  Rhythm: regular  Rate: normal  (-) murmur     Dental - normal exam           Pulmonary - normal exam  Breath sounds clear to auscultation     Abdominal    Neurological - normal exam                   Anesthesia Plan    ASA 2       Plan - general       Airway plan will be LMA          Induction: intravenous    Postoperative " Plan: Postoperative administration of opioids is intended.    Pertinent diagnostic labs and testing reviewed    Informed Consent:    Anesthetic plan and risks discussed with patient.    Use of blood products discussed with: patient whom consented to blood products.

## 2024-05-16 NOTE — PROGRESS NOTES
Medication history reviewed with PT at bedside    [facility] is complete per PT reporting    Allergies reviewed.     Patient denies any outpatient antibiotics in the last 30 days.     Patient is not  taking anticoagulants.    Preferred pharmacy for this visit - [facility] ([phone])

## 2024-05-16 NOTE — ANESTHESIA POSTPROCEDURE EVALUATION
Patient: Ozzy Do    Procedure Summary       Date: 05/16/24 Room / Location: [facility] 08 / SURGERY [facility]    Anesthesia Start: 0946 Anesthesia Stop: 1020    Procedure: MUSCLE BIOPSY - RIGHT QUADRICEPS (Right: Leg Upper) Diagnosis: (MUSCLE WEAKNESS OF LIMB)    Surgeons: Shae Benedict M.D. Responsible Provider: Sid Mario M.D.    Anesthesia Type: general ASA Status: 2            Final Anesthesia Type: general  Last vitals  BP   Blood Pressure: 135/75    Temp   36.2 °C (97.1 °F)    Pulse   69   Resp   12    SpO2   97 %      Anesthesia Post Evaluation    Patient location during evaluation: PACU  Patient participation: complete - patient participated  Level of consciousness: sleepy but conscious    Airway patency: patent  Anesthetic complications: no  Cardiovascular status: hemodynamically stable  Respiratory status: acceptable  Hydration status: balanced    PONV: none          No notable events documented.     Nurse Pain Score: 3 (NPRS)

## 2024-05-16 NOTE — OR NURSING
1113 Received patient from PACU and received report from Yadi ARVIZU. [facility] checked. Reports no needs for pain or nausea. Patient's ride called to  patient in PACU.     1136 Patient dressed, PIV removed. Patient discharged out via wheelchair by CNA to car. All belongings sent with patient. Discharge instructions reviewed and signed. All questions answered.

## 2024-05-20 ENCOUNTER — OFFICE VISIT (OUTPATIENT)
Dept: BEHAVIORAL HEALTH | Facility: CLINIC | Age: 44
End: 2024-05-20
Payer: COMMERCIAL

## 2024-05-20 DIAGNOSIS — F33.40 RECURRENT MAJOR DEPRESSIVE DISORDER, IN REMISSION (HCC): ICD-10-CM

## 2024-05-20 DIAGNOSIS — R41.840 INATTENTION: ICD-10-CM

## 2024-05-20 DIAGNOSIS — F41.1 GENERALIZED ANXIETY DISORDER: ICD-10-CM

## 2024-05-20 PROCEDURE — 99214 OFFICE O/P EST MOD 30 MIN: CPT | Mod: GC | Performed by: PSYCHIATRY & NEUROLOGY

## 2024-05-20 RX ORDER — BUSPIRONE HYDROCHLORIDE 7.5 MG/1
TABLET ORAL
Qty: 92 TABLET | Refills: 1 | Status: SHIPPED | OUTPATIENT
Start: 2024-05-20 | End: 2024-06-19
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
## 2024-06-13 RX ORDER — BUSPIRONE HYDROCHLORIDE 7.5 MG/1
TABLET ORAL
Qty: 276 TABLET | Refills: 1 | Status: SHIPPED | OUTPATIENT
Start: 2024-06-13 | End: 2024-07-12

## 2024-06-17 ENCOUNTER — OFFICE VISIT (OUTPATIENT)
Dept: BEHAVIORAL HEALTH | Facility: CLINIC | Age: 44
End: 2024-06-17
Payer: COMMERCIAL

## 2024-06-17 DIAGNOSIS — F33.40 RECURRENT MAJOR DEPRESSIVE DISORDER, IN REMISSION (HCC): ICD-10-CM

## 2024-06-17 DIAGNOSIS — R41.840 INATTENTION: ICD-10-CM

## 2024-06-17 DIAGNOSIS — F41.1 GENERALIZED ANXIETY DISORDER: ICD-10-CM

## 2024-06-17 PROCEDURE — 99214 OFFICE O/P EST MOD 30 MIN: CPT | Performed by: PSYCHIATRY & NEUROLOGY
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
## 2024-07-02 PROBLEM — M79.7 FIBROMYALGIA: Status: ACTIVE | Noted: 2024-04-24

## 2024-07-25 ENCOUNTER — OFFICE VISIT (OUTPATIENT)
Dept: RHEUMATOLOGY | Facility: MEDICAL CENTER | Age: 44
End: 2024-07-25
Attending: INTERNAL MEDICINE
Payer: COMMERCIAL

## 2024-07-25 VITALS
DIASTOLIC BLOOD PRESSURE: 52 MMHG | BODY MASS INDEX: 28.87 KG/M2 | SYSTOLIC BLOOD PRESSURE: 108 MMHG | HEART RATE: 72 BPM | WEIGHT: 163 LBS | RESPIRATION RATE: 12 BRPM | TEMPERATURE: 98 F

## 2024-07-25 DIAGNOSIS — M79.7 FIBROMYALGIA: ICD-10-CM

## 2024-07-25 DIAGNOSIS — M60.9 MYOSITIS, UNSPECIFIED MYOSITIS TYPE, UNSPECIFIED SITE: ICD-10-CM

## 2024-07-25 DIAGNOSIS — Z79.899 LONG-TERM USE OF PLAQUENIL: ICD-10-CM

## 2024-07-25 PROCEDURE — 3078F DIAST BP <80 MM HG: CPT | Performed by: INTERNAL MEDICINE

## 2024-07-25 PROCEDURE — 99212 OFFICE O/P EST SF 10 MIN: CPT | Performed by: INTERNAL MEDICINE

## 2024-07-25 PROCEDURE — 99214 OFFICE O/P EST MOD 30 MIN: CPT | Performed by: INTERNAL MEDICINE

## 2024-07-25 PROCEDURE — 3074F SYST BP LT 130 MM HG: CPT | Performed by: INTERNAL MEDICINE

## 2024-07-25 RX ORDER — CLOBETASOL PROPIONATE 0.5 MG/ML
SOLUTION TOPICAL
COMMUNITY
Start: 2024-07-24

## 2024-07-25 RX ORDER — HYDROXYCHLOROQUINE SULFATE 200 MG/1
TABLET, FILM COATED ORAL
Qty: 180 TABLET | Refills: 0 | Status: SHIPPED | OUTPATIENT
Start: 2024-07-25

## 2024-07-25 RX ORDER — MELOXICAM 15 MG/1
15 TABLET ORAL
COMMUNITY
Start: 2024-06-20

## 2024-07-25 RX ORDER — BUSPIRONE HYDROCHLORIDE 7.5 MG/1
7.5 TABLET ORAL 2 TIMES DAILY
COMMUNITY

## 2024-07-25 ASSESSMENT — FIBROSIS 4 INDEX: FIB4 SCORE: 0.67

## 2024-09-03 ENCOUNTER — HOSPITAL ENCOUNTER (OUTPATIENT)
Dept: LAB | Facility: MEDICAL CENTER | Age: 44
End: 2024-09-03
Attending: FAMILY MEDICINE
Payer: COMMERCIAL

## 2024-09-03 LAB
CK SERPL-CCNC: 77 U/L (ref 0–154)
CRP SERPL HS-MCNC: 6.2 MG/L (ref 0–3)
ERYTHROCYTE [SEDIMENTATION RATE] IN BLOOD BY WESTERGREN METHOD: 5 MM/HOUR (ref 0–25)
RHEUMATOID FACT SER IA-ACNC: <10 IU/ML (ref 0–14)

## 2024-09-03 PROCEDURE — 86431 RHEUMATOID FACTOR QUANT: CPT

## 2024-09-03 PROCEDURE — 82550 ASSAY OF CK (CPK): CPT

## 2024-09-03 PROCEDURE — 86141 C-REACTIVE PROTEIN HS: CPT

## 2024-09-03 PROCEDURE — 86038 ANTINUCLEAR ANTIBODIES: CPT

## 2024-09-03 PROCEDURE — 86039 ANTINUCLEAR ANTIBODIES (ANA): CPT

## 2024-09-03 PROCEDURE — 85652 RBC SED RATE AUTOMATED: CPT

## 2024-09-03 PROCEDURE — 36415 COLL VENOUS BLD VENIPUNCTURE: CPT

## 2024-09-05 LAB — NUCLEAR IGG SER QL IA: DETECTED

## 2024-09-06 LAB
ANA PAT SER IF-IMP: NORMAL
NUCLEAR IGG SER QL IF: NORMAL

## 2024-10-17 ENCOUNTER — HOSPITAL ENCOUNTER (OUTPATIENT)
Dept: LAB | Facility: MEDICAL CENTER | Age: 44
End: 2024-10-17
Attending: INTERNAL MEDICINE
Payer: COMMERCIAL

## 2024-10-17 DIAGNOSIS — Z79.899 LONG-TERM USE OF PLAQUENIL: ICD-10-CM

## 2024-10-17 DIAGNOSIS — M60.9 MYOSITIS, UNSPECIFIED MYOSITIS TYPE, UNSPECIFIED SITE: ICD-10-CM

## 2024-10-17 LAB — CK SERPL-CCNC: 138 U/L (ref 0–154)

## 2024-10-17 PROCEDURE — 82085 ASSAY OF ALDOLASE: CPT

## 2024-10-17 PROCEDURE — 36415 COLL VENOUS BLD VENIPUNCTURE: CPT

## 2024-10-17 PROCEDURE — 82550 ASSAY OF CK (CPK): CPT

## 2024-10-17 PROCEDURE — 82955 ASSAY OF G6PD ENZYME: CPT

## 2024-10-21 LAB
ALDOLASE SERPL-CCNC: 2.4 U/L (ref 1.2–7.6)
G6PD RBC-CCNC: 13.5 U/G HB (ref 9.9–16.6)

## 2024-10-24 ENCOUNTER — OFFICE VISIT (OUTPATIENT)
Dept: RHEUMATOLOGY | Facility: MEDICAL CENTER | Age: 44
End: 2024-10-24
Attending: INTERNAL MEDICINE
Payer: COMMERCIAL

## 2024-10-24 VITALS
BODY MASS INDEX: 29.05 KG/M2 | WEIGHT: 164 LBS | SYSTOLIC BLOOD PRESSURE: 110 MMHG | RESPIRATION RATE: 12 BRPM | HEART RATE: 68 BPM | TEMPERATURE: 97.4 F | DIASTOLIC BLOOD PRESSURE: 60 MMHG | OXYGEN SATURATION: 96 %

## 2024-10-24 DIAGNOSIS — M33.10 DERMATOMYOSITIS SINE MYOSITIS (HCC): ICD-10-CM

## 2024-10-24 DIAGNOSIS — R25.1 TREMORS OF NERVOUS SYSTEM: ICD-10-CM

## 2024-10-24 DIAGNOSIS — M79.7 FIBROMYALGIA: ICD-10-CM

## 2024-10-24 DIAGNOSIS — G47.30 SLEEP APNEA, UNSPECIFIED TYPE: ICD-10-CM

## 2024-10-24 PROCEDURE — 99212 OFFICE O/P EST SF 10 MIN: CPT | Performed by: INTERNAL MEDICINE

## 2024-10-24 PROCEDURE — 99214 OFFICE O/P EST MOD 30 MIN: CPT | Performed by: INTERNAL MEDICINE

## 2024-10-24 PROCEDURE — 3074F SYST BP LT 130 MM HG: CPT | Performed by: INTERNAL MEDICINE

## 2024-10-24 PROCEDURE — 3078F DIAST BP <80 MM HG: CPT | Performed by: INTERNAL MEDICINE

## 2024-10-24 RX ORDER — CELECOXIB 200 MG/1
CAPSULE ORAL
Qty: 180 CAPSULE | Refills: 1 | Status: SHIPPED | OUTPATIENT
Start: 2024-10-24

## 2024-10-24 RX ORDER — DULOXETIN HYDROCHLORIDE 60 MG/1
CAPSULE, DELAYED RELEASE ORAL
COMMUNITY
Start: 2024-10-22

## 2024-10-24 ASSESSMENT — FIBROSIS 4 INDEX: FIB4 SCORE: 0.67

## 2025-01-09 ENCOUNTER — APPOINTMENT (OUTPATIENT)
Dept: RHEUMATOLOGY | Facility: MEDICAL CENTER | Age: 45
End: 2025-01-09
Attending: INTERNAL MEDICINE
Payer: COMMERCIAL

## 2025-01-21 ENCOUNTER — HOSPITAL ENCOUNTER (OUTPATIENT)
Dept: LAB | Facility: MEDICAL CENTER | Age: 45
End: 2025-01-21
Attending: INTERNAL MEDICINE
Payer: COMMERCIAL

## 2025-01-21 ENCOUNTER — OFFICE VISIT (OUTPATIENT)
Dept: RHEUMATOLOGY | Facility: MEDICAL CENTER | Age: 45
End: 2025-01-21
Attending: INTERNAL MEDICINE
Payer: COMMERCIAL

## 2025-01-21 VITALS
TEMPERATURE: 97.7 F | DIASTOLIC BLOOD PRESSURE: 72 MMHG | WEIGHT: 167 LBS | HEART RATE: 79 BPM | OXYGEN SATURATION: 98 % | BODY MASS INDEX: 29.58 KG/M2 | SYSTOLIC BLOOD PRESSURE: 128 MMHG | RESPIRATION RATE: 16 BRPM

## 2025-01-21 DIAGNOSIS — M79.7 FIBROMYALGIA: ICD-10-CM

## 2025-01-21 DIAGNOSIS — G47.30 SLEEP APNEA, UNSPECIFIED TYPE: ICD-10-CM

## 2025-01-21 DIAGNOSIS — R79.0 ABNORMAL BLOOD LEVEL OF COPPER: ICD-10-CM

## 2025-01-21 DIAGNOSIS — M33.10 DERMATOMYOSITIS SINE MYOSITIS (HCC): ICD-10-CM

## 2025-01-21 PROCEDURE — 36415 COLL VENOUS BLD VENIPUNCTURE: CPT

## 2025-01-21 PROCEDURE — 82525 ASSAY OF COPPER: CPT

## 2025-01-21 PROCEDURE — 3078F DIAST BP <80 MM HG: CPT | Performed by: INTERNAL MEDICINE

## 2025-01-21 PROCEDURE — 3074F SYST BP LT 130 MM HG: CPT | Performed by: INTERNAL MEDICINE

## 2025-01-21 PROCEDURE — 99214 OFFICE O/P EST MOD 30 MIN: CPT | Performed by: INTERNAL MEDICINE

## 2025-01-21 PROCEDURE — 99212 OFFICE O/P EST SF 10 MIN: CPT | Performed by: INTERNAL MEDICINE

## 2025-01-21 ASSESSMENT — FIBROSIS 4 INDEX: FIB4 SCORE: 0.67

## 2025-01-21 NOTE — PROGRESS NOTES
Chief Complaint- joint pain     Subjective:   Ozzy Do is a 44 y.o. female here today for follow up of rheumatological issues    This is a follow-up visit for this patient who we see in this clinic for polyarthralgias and rash, after extensive workup patient was found to have a positive MI-2 compatible with possible dermatomyositis.  However patient's myositis workup was all been negative including muscle biopsy and normal CPKs and aldolase.  However patient continues to complain of muscle weakness and pain.  Patient also seeing neurology and has been recently status post a nerve biopsy for evaluation of small fiber neuropathy of which results are pending.  We had done a trial of Plaquenil which patient states was completely ineffective and stopped.  Patient has not had heliotrope rash, has not had any Gottron's papules, has not had any shawl like rash.  Patient did have a sparse pustular rash on her extremities while vacationing in [place] and [place] which spontaneously resolved.       Summary, positive MI-2  Status post rash biopsy from dermatology which was reported as negative  Status post muscle biopsy which did not show inflammatory myositis  Status post EMG/NCV of bilateral lower extremities September 2023 which is normal  CPKs and aldolase all have been negative      Of note patient does have a past history of fibromyalgia for which patient is treated with Lyrica by other physician.    S/p plaquenil-ineffective        PMHx  Asthma  Tremors, possible seizures     Fam Hx  M-RA, fibromyalgia, depression  F-CAD, HTN  S-thrombocytopenia,      Soc Hx  Pos tobacco, quit 2013  ETOH 4-5/week  No blood tx  Positive tattoos  No IVDA  Patient works as a hairdresser     G6PD 13.5 adequate 10/2024  RF neg 9/2023; RF neg 6/2024 [facility]; RF neg 9/2024  CCP neg 9/2023  CPK 69 nl 6/2024 [facility];  nl 10/2024  TYLER 1:160 6/2024 [facility]  TYLER IFA neg 6/2024 [facility]  Centromere neg 6/2024 [facility]  dsDNA neg 9/2023;  dsDNA neg 6/2024 [facility]  Bridges neg 9/2023; Bridges neg 6/2024 [facility]  RNP neg 6/2024 [facility]  SSA neg 9/2023; SSA neg 2/2024; SSA neg 6/2024 [facility]  SSB neg 9/2023; SSB neg 2/2024; SSB neg 6/2024 [facility]  Scl-70 neg 9/2023; Scl-70 neg 6/2024 [facility]  Cardiolipin neg 6/2024 [facility]  C3 140 nl 6/2024 [facility]  C4 21 nl 6/2024 [facility]  MEAGAN-1 neg 9/2023; MEAGAN-1 neg 2/2024  PM/Scl 100 neg 2/2024       F actin neg 2/2024  AMA neg 2/2024  Thyroglobulin neg 2/2024  TPO neg 2/2024; TPO neg  6/2024 [facility]  MPO neg 9/2023  PR3 neg 9/2023  HLA B27 nrg 9/2023  Centromere neg 9/2023  Lyme neg 6/2023; Lyme neg 6/2024 [facility]  Uric acid 3.6 nl 9/2023  CPK 56 nl 9/2023; CPK 76 nl 4/2024  Aldolase 2.3 nl 9/2023; aldolase 2.4 nl 4/2024     Hand x-rays 9/2023-IMPRESSION:  No erosive changes identified.     Feet x-rays 9/2023-IMPRESSION:  Minimal degenerative changes of the MTP joints. No erosive changes.     Left Knee x-ray 9/2023-IMPRESSION:  Mildly narrowed sclerotic medial knee joint compartment. No erosive changes identified.     MRI Brain 4/2023-IMPRESSION:  MRI of the brain without contrast within normal limits for age with scant RIGHT frontal white matter changes.     MRI C spine 6/2023-IMPRESSION:  Contrast enhanced MRI of the cervical spine within normal limits.     MRI T spine 6/2023-IMPRESSION:  MRI of the thoracic spine without and with contrast within normal limits for age.     EMG/NCV Bilateral LE 9/2023-IMPRESSION:  This is a normal study of the lower extremities. There is no electrophysiologic evidence of a large fiber peripheral neuropathy, myopathy,  or left lumbosacral radiculopathy.   Carla Hunt MD  Neurology - Neurophysiology     Echocardiogram 4/2021-CONCLUSIONS  Normal echocardiogram study.      Muscle Biopsy 5/2024       Current Outpatient Medications   Medication Sig Dispense Refill    DULoxetine (CYMBALTA) 60 MG Cap DR Particles delayed-release capsule       celecoxib (CELEBREX) 200 MG Cap 1 tab po bid with  food prn pain 180 Capsule 1    clobetasol (TEMOVATE) 0.05 % external solution       JUNEL FE 1.5/30 1.5-30 MG-MCG tablet       VITAMIN D PO Take 1 Capsule by mouth every day.      Omega-3 Fatty Acids (OMEGA 3 PO) Take 1 Capsule by mouth every day.      pregabalin (LYRICA) 150 MG Cap Take 150 mg by mouth 3 times a day.      Bacillus Coagulans-Inulin (PROBIOTIC) 1-250 BILLION-MG Cap Take 1 Capsule by mouth every day.      fluoxetine (PROZAC) 40 MG capsule Take 40 mg by mouth every day.      busPIRone (BUSPAR) 7.5 MG tablet Take 7.5 mg by mouth 2 times a day. 1/2 pill daily (Patient not taking: Reported on 1/21/2025)       No current facility-administered medications for this visit.     She  has a past medical history of Anesthesia, Asthma, Awareness under anesthesia, Pain (10/2022), PONV (postoperative nausea and vomiting), Psychiatric problem, and Sleep apnea (Recent).    ROS   Other than what is mentioned in HPI or physical exam, there is no history of headaches, double vision or blurred vision.  No trouble swallowing difficulties .  No chest complaints including chest pain, cough or sputum production. No GI complaints including nausea, vomiting, change in bowel habits, or past peptic ulcer disease. No history of blood in the stools. No urinary complaints including dysuria or frequency. No history of alopecia, photosensitivity     Objective:     /72   Pulse 79   Temp 36.5 °C (97.7 °F) (Temporal)   Resp 16   Wt 75.8 kg (167 lb)   SpO2 98%  Body mass index is 29.58 kg/m².   Physical Exam:    Constitutional: Alert and oriented X3, patient is talkative with good eye contact.Skin: Warm, dry, good turgor, no rashes in visible areas.Eye: Equal, round and reactive, conjunctiva clear, lids normal EOM intactENMT: Lips without lesions,  pinna without deformityNeck: Trachea midline, no masses, no thyromegaly.Lymph:  No cervical lymphadenopathy, no axillary lymphadenopathy, no supraclavicular  lymphadenopathyRespiratory: Unlabored respiratory effort, lungs clear to auscultation, no wheezes, no ronchi.Cardiovascular: Normal S1, S2, Regular rate and rhythm, no murmurs rubs or gallops  .Abdomen: Soft, non-distended.Psych: Alert and oriented x3, normal affect and mood.Neuro: Cranial nerves 2-12 are grossly intact Ext:no joint laxity noted in bilateral arms, no joint laxity noted in bilateral legs, no deformities, no sausage digits, patient states she does have some achiness on the dorsal aspect of both hands, did not appreciate any rash or pustules or erythema or swelling, elbows without flexion contractures no nodules no tophi, shoulders full range of motion without limitations, knees minimal crepitus no synovitis, toes without crossover toes and without splay toes    Lab Results   Component Value Date/Time    SODIUM 136 09/05/2023 12:02 PM    POTASSIUM 4.0 09/05/2023 12:02 PM    CHLORIDE 103 09/05/2023 12:02 PM    CO2 22 09/05/2023 12:02 PM    GLUCOSE 85 09/05/2023 12:02 PM    BUN 15 09/05/2023 12:02 PM    CREATININE 0.92 09/05/2023 12:02 PM      Lab Results   Component Value Date/Time    WBC 7.1 09/05/2023 12:02 PM    RBC 3.99 (L) 09/05/2023 12:02 PM    HEMOGLOBIN 13.2 09/05/2023 12:02 PM    HEMATOCRIT 40.4 09/05/2023 12:02 PM    .3 (H) 09/05/2023 12:02 PM    MCH 33.1 (H) 09/05/2023 12:02 PM    MCHC 32.7 09/05/2023 12:02 PM    MPV 10.5 09/05/2023 12:02 PM    NEUTSPOLYS 61.50 09/05/2023 12:02 PM    LYMPHOCYTES 30.00 09/05/2023 12:02 PM    MONOCYTES 5.30 09/05/2023 12:02 PM    EOSINOPHILS 2.30 09/05/2023 12:02 PM    BASOPHILS 0.60 09/05/2023 12:02 PM      Lab Results   Component Value Date/Time    CALCIUM 9.1 09/05/2023 12:02 PM    ASTSGOT 15 09/05/2023 12:02 PM    ALTSGPT 14 09/05/2023 12:02 PM    ALKPHOSPHAT 53 09/05/2023 12:02 PM    TBILIRUBIN 0.3 09/05/2023 12:02 PM    ALBUMIN 4.5 09/05/2023 12:02 PM    TOTPROTEIN 7.4 09/05/2023 12:02 PM     Lab Results   Component Value Date/Time    URICACID 3.6  09/05/2023 12:02 PM    RHEUMFACTN <10 09/03/2024 02:55 PM    ANTINUCAB Detected (A) 09/03/2024 02:55 PM     Lab Results   Component Value Date/Time    ANTIDNADS SEE BELOW 09/05/2023 12:02 PM    RNPAB 5 02/15/2024 01:15 PM    SMITHAB 2 09/05/2023 12:02 PM    QBDLMBB34 1 09/05/2023 12:02 PM    CENTROMBAB 0 09/05/2023 12:02 PM     Lab Results   Component Value Date/Time    ANTIDNADS SEE BELOW 09/05/2023 12:02 PM    JO1AB 0 02/15/2024 01:15 PM    RNPAB 5 02/15/2024 01:15 PM    ANTISSBSJ 1 09/05/2023 12:02 PM     Lab Results   Component Value Date/Time    SEDRATEWES 5 09/03/2024 02:55 PM     Lab Results   Component Value Date/Time    NYPM93GNAX Negative 09/21/2023 10:27 AM     Lab Results   Component Value Date/Time    G6PD 13.5 10/17/2024 10:42 AM     Lab Results   Component Value Date/Time    CPKTOTAL 138 10/17/2024 10:42 AM     Lab Results   Component Value Date/Time    MICROSOMALA 9.0 02/15/2024 01:15 PM    ANTITHYROGL <0.9 02/15/2024 01:15 PM     Lab Results   Component Value Date/Time    ANTIMITOCHO 9.5 02/15/2024 01:15 PM    FACTIN 7 02/15/2024 01:15 PM     Assessment and Plan:     1. 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  After long discussion we opted to wait until her follow-up with her neurologist to evaluate the results of her nerve biopsy for evaluation of possible fibromyalgia.    2. Abnormal blood level of copper  Patient did have an abnormal elevated copper level in the serum from June 2023, we will recheck today, if continues to be elevated then we may do a possible workup for Darci's disease.  - COPPER, SERUM; Future    3. Fibromyalgia  Awaiting results of nerve biopsy for evaluation of small fiber  neuropathy.    4. Sleep apnea, unspecified type  Patient uses CPAP nightly    Followup: Return in about 3 months (around 4/21/2025). or sooner prn      Please note that this dictation was created using voice recognition software. I have made every reasonable attempt to correct obvious errors, but I expect that there are errors of grammar and possibly content that I did not discover before finalizing the note.

## 2025-01-23 LAB — COPPER SERPL-MCNC: 200.3 UG/DL (ref 80–155)

## 2025-01-30 ENCOUNTER — RESEARCH ENCOUNTER (OUTPATIENT)
Dept: RESEARCH | Facility: MEDICAL CENTER | Age: 45
End: 2025-01-30
Payer: COMMERCIAL

## 2025-01-30 DIAGNOSIS — Z00.6 RESEARCH STUDY PATIENT: ICD-10-CM

## 2025-02-12 ENCOUNTER — HOSPITAL ENCOUNTER (OUTPATIENT)
Dept: LAB | Facility: MEDICAL CENTER | Age: 45
End: 2025-02-12
Attending: FAMILY MEDICINE
Payer: COMMERCIAL

## 2025-02-12 DIAGNOSIS — Z00.6 RESEARCH STUDY PATIENT: ICD-10-CM

## 2025-02-24 LAB
APOB+LDLR+PCSK9 GENE MUT ANL BLD/T: NOT DETECTED
BRCA1+BRCA2 DEL+DUP + FULL MUT ANL BLD/T: NOT DETECTED
MLH1+MSH2+MSH6+PMS2 GN DEL+DUP+FUL M: NOT DETECTED

## 2025-02-26 ENCOUNTER — HOSPITAL ENCOUNTER (OUTPATIENT)
Dept: RADIOLOGY | Facility: MEDICAL CENTER | Age: 45
End: 2025-02-26
Attending: FAMILY MEDICINE
Payer: COMMERCIAL

## 2025-02-26 DIAGNOSIS — Z12.31 VISIT FOR SCREENING MAMMOGRAM: ICD-10-CM

## 2025-02-26 PROCEDURE — 77067 SCR MAMMO BI INCL CAD: CPT

## 2025-02-27 ENCOUNTER — RESULTS FOLLOW-UP (OUTPATIENT)
Dept: MEDICAL GROUP | Facility: PHYSICIAN GROUP | Age: 45
End: 2025-02-27

## 2025-03-11 ENCOUNTER — HOSPITAL ENCOUNTER (OUTPATIENT)
Dept: LAB | Facility: MEDICAL CENTER | Age: 45
End: 2025-03-11
Attending: INTERNAL MEDICINE
Payer: COMMERCIAL

## 2025-03-11 DIAGNOSIS — M60.9 MYOSITIS, UNSPECIFIED MYOSITIS TYPE, UNSPECIFIED SITE: ICD-10-CM

## 2025-03-11 LAB — CK SERPL-CCNC: 67 U/L (ref 0–154)

## 2025-03-11 PROCEDURE — 82085 ASSAY OF ALDOLASE: CPT

## 2025-03-11 PROCEDURE — 36415 COLL VENOUS BLD VENIPUNCTURE: CPT

## 2025-03-11 PROCEDURE — 82550 ASSAY OF CK (CPK): CPT

## 2025-03-13 LAB — ALDOLASE SERPL-CCNC: 2.8 U/L (ref 1.2–7.6)

## 2025-05-22 ENCOUNTER — HOSPITAL ENCOUNTER (OUTPATIENT)
Dept: CARDIOLOGY | Facility: MEDICAL CENTER | Age: 45
End: 2025-05-22
Attending: FAMILY MEDICINE
Payer: COMMERCIAL

## 2025-05-22 DIAGNOSIS — R01.1 UNDIAGNOSED CARDIAC MURMURS: ICD-10-CM

## 2025-05-22 LAB
LV EJECT FRACT  99904: 61
LV EJECT FRACT MOD 2C 99903: 58.27
LV EJECT FRACT MOD 4C 99902: 64.51
LV EJECT FRACT MOD BP 99901: 60.86

## 2025-05-22 PROCEDURE — 93306 TTE W/DOPPLER COMPLETE: CPT | Mod: 26 | Performed by: INTERNAL MEDICINE

## 2025-05-22 PROCEDURE — 93306 TTE W/DOPPLER COMPLETE: CPT

## 2025-05-28 ENCOUNTER — APPOINTMENT (OUTPATIENT)
Facility: MEDICAL CENTER | Age: 45
End: 2025-05-28
Attending: FAMILY MEDICINE
Payer: COMMERCIAL

## 2025-08-06 ENCOUNTER — OFFICE VISIT (OUTPATIENT)
Dept: RHEUMATOLOGY | Facility: MEDICAL CENTER | Age: 45
End: 2025-08-06
Attending: STUDENT IN AN ORGANIZED HEALTH CARE EDUCATION/TRAINING PROGRAM
Payer: COMMERCIAL

## 2025-08-06 ENCOUNTER — HOSPITAL ENCOUNTER (OUTPATIENT)
Dept: LAB | Facility: MEDICAL CENTER | Age: 45
End: 2025-08-06
Attending: STUDENT IN AN ORGANIZED HEALTH CARE EDUCATION/TRAINING PROGRAM
Payer: COMMERCIAL

## 2025-08-06 VITALS
TEMPERATURE: 97.6 F | RESPIRATION RATE: 16 BRPM | OXYGEN SATURATION: 96 % | SYSTOLIC BLOOD PRESSURE: 98 MMHG | HEIGHT: 63 IN | WEIGHT: 166 LBS | HEART RATE: 68 BPM | BODY MASS INDEX: 29.41 KG/M2 | DIASTOLIC BLOOD PRESSURE: 68 MMHG

## 2025-08-06 DIAGNOSIS — M79.7 FIBROMYALGIA: ICD-10-CM

## 2025-08-06 DIAGNOSIS — H15.103 EPISCLERITIS OF BOTH EYES: ICD-10-CM

## 2025-08-06 DIAGNOSIS — M33.10 DERMATOMYOSITIS SINE MYOSITIS (HCC): Primary | ICD-10-CM

## 2025-08-06 DIAGNOSIS — R13.10 DYSPHAGIA, UNSPECIFIED TYPE: ICD-10-CM

## 2025-08-06 DIAGNOSIS — M33.10 DERMATOMYOSITIS SINE MYOSITIS (HCC): ICD-10-CM

## 2025-08-06 DIAGNOSIS — R25.1 PHYSIOLOGICAL TREMOR: ICD-10-CM

## 2025-08-06 DIAGNOSIS — R59.0 LYMPHADENOPATHY, AXILLARY: ICD-10-CM

## 2025-08-06 LAB — CK SERPL-CCNC: 85 U/L (ref 0–154)

## 2025-08-06 PROCEDURE — 83516 IMMUNOASSAY NONANTIBODY: CPT | Mod: 91

## 2025-08-06 PROCEDURE — 84182 PROTEIN WESTERN BLOT TEST: CPT | Mod: 91

## 2025-08-06 PROCEDURE — 82085 ASSAY OF ALDOLASE: CPT

## 2025-08-06 PROCEDURE — 3078F DIAST BP <80 MM HG: CPT | Performed by: STUDENT IN AN ORGANIZED HEALTH CARE EDUCATION/TRAINING PROGRAM

## 2025-08-06 PROCEDURE — 86235 NUCLEAR ANTIGEN ANTIBODY: CPT | Mod: 91

## 2025-08-06 PROCEDURE — 99214 OFFICE O/P EST MOD 30 MIN: CPT | Performed by: STUDENT IN AN ORGANIZED HEALTH CARE EDUCATION/TRAINING PROGRAM

## 2025-08-06 PROCEDURE — 36415 COLL VENOUS BLD VENIPUNCTURE: CPT

## 2025-08-06 PROCEDURE — 99417 PROLNG OP E/M EACH 15 MIN: CPT | Performed by: STUDENT IN AN ORGANIZED HEALTH CARE EDUCATION/TRAINING PROGRAM

## 2025-08-06 PROCEDURE — 3074F SYST BP LT 130 MM HG: CPT | Performed by: STUDENT IN AN ORGANIZED HEALTH CARE EDUCATION/TRAINING PROGRAM

## 2025-08-06 PROCEDURE — 99215 OFFICE O/P EST HI 40 MIN: CPT | Performed by: STUDENT IN AN ORGANIZED HEALTH CARE EDUCATION/TRAINING PROGRAM

## 2025-08-06 PROCEDURE — 86039 ANTINUCLEAR ANTIBODIES (ANA): CPT

## 2025-08-06 PROCEDURE — 82550 ASSAY OF CK (CPK): CPT

## 2025-08-06 RX ORDER — PREGABALIN 200 MG/1
200 CAPSULE ORAL 3 TIMES DAILY
COMMUNITY
Start: 2023-03-03

## 2025-08-06 ASSESSMENT — FIBROSIS 4 INDEX: FIB4 SCORE: 0.68

## 2025-08-08 LAB — ALDOLASE SERPL-CCNC: 3.1 U/L (ref 1.2–7.6)

## 2025-08-13 LAB
ANA PAT SER IF-IMP: NORMAL
ANNOTATION COMMENT IMP: NORMAL
EJ AB SER QL: NEGATIVE
ENA JO1 AB TITR SER: 1 AU/ML (ref 0–40)
ENA SS-A 60KD AB SER-ACNC: 0 AU/ML (ref 0–40)
ENA SS-A IGG SER QL: 1 AU/ML (ref 0–40)
FIBRILLARIN AB SER QL: NEGATIVE
HA AB SER QL LINE BLOT: NEGATIVE
KS AB SER QL LINE BLOT: NEGATIVE
KU AB SER QL: NEGATIVE
MDA5 AB SER QL LINE BLOT: NEGATIVE
MI2 AB SER QL: NEGATIVE
MJ AB SER QL LINE BLOT: NEGATIVE
NUCLEAR IGG SER QL IF: NORMAL
OJ AB SER QL: NEGATIVE
P155 140  ANTIBODY Q6011: NEGATIVE
PL12 AB SER QL: NEGATIVE
PL7 AB SER QL: NEGATIVE
PM/SCL-100 AB SER QL LINE BLOT: NEGATIVE
SAE1 AB SER QL LINE BLOT: NEGATIVE
SRP AB SERPL QL: NEGATIVE
TIF1-GAMMA AB SER QL LINE BLOT: NEGATIVE
U1 SNRNP IGG SER QL: 6 UNITS (ref 0–19)
ZO AB SER QL LINE BLOT: NEGATIVE

## 2025-09-16 ENCOUNTER — APPOINTMENT (OUTPATIENT)
Dept: GYNECOLOGY | Facility: CLINIC | Age: 45
End: 2025-09-16
Payer: COMMERCIAL

## (undated) DEVICE — BLADE SHAVER AGGRESSIVE PLUS 4.0MM ANGLED (5EA/BX)

## (undated) DEVICE — HEAD HOLDER JUNIOR/ADULT

## (undated) DEVICE — CANISTER SUCTION RIGID RED 1500CC (40EA/CA)

## (undated) DEVICE — ELECTRODE DUAL RETURN W/ CORD - (50/PK)

## (undated) DEVICE — ABLATOR WAND SERFAS 90-S CRUISE

## (undated) DEVICE — MASK ANESTHESIA ADULT  - (100/CA)

## (undated) DEVICE — GOWN SURGICAL X-LARGE ULTRA - FILM-REINFORCED (20/CA)

## (undated) DEVICE — GLOVE BIOGEL SZ 8 SURGICAL PF LTX - (50PR/BX 4BX/CA)

## (undated) DEVICE — SUTURE 3-0 PROLENE PS-1 (12PK/BX)

## (undated) DEVICE — BLADE SURGICAL #15 - (50/BX 3BX/CA)

## (undated) DEVICE — STAPLER SKIN DISP - (6/BX 10BX/CA) VISISTAT

## (undated) DEVICE — WATER IRRIGATION STERILE 1000ML (12EA/CA)

## (undated) DEVICE — PROTECTOR ULNA NERVE - (36PR/CA)

## (undated) DEVICE — TUBE CONNECTING SUCTION - CLEAR PLASTIC STERILE 72 IN (50EA/CA)

## (undated) DEVICE — MASK, LARYNGEAL AIRWAY #4

## (undated) DEVICE — SET LEADWIRE 5 LEAD BEDSIDE DISPOSABLE ECG (1SET OF 5/EA)

## (undated) DEVICE — SYRINGE 10 ML CONTROL LL (25EA/BX 4BX/CA)

## (undated) DEVICE — GLOVE, LITE (PAIR)

## (undated) DEVICE — GOWN WARMING STANDARD FLEX - (30/CA)

## (undated) DEVICE — KIT ANESTHESIA W/CIRCUIT & 3/LT BAG W/FILTER (20EA/CA)

## (undated) DEVICE — NEPTUNE 4 PORT MANIFOLD - (20/PK)

## (undated) DEVICE — SPONGE GAUZESTER 4 X 4 4PLY - (128PK/CA)

## (undated) DEVICE — TUBING PATIENT W/CONNECTOR REDEUCE (1EA)

## (undated) DEVICE — SENSOR OXIMETER ADULT SPO2 RD SET (20EA/BX)

## (undated) DEVICE — LACTATED RINGERS INJ 1000 ML - (14EA/CA 60CA/PF)

## (undated) DEVICE — SUCTION INSTRUMENT YANKAUER BULBOUS TIP W/O VENT (50EA/CA)

## (undated) DEVICE — TUBING PUMP WITH CONNECTOR REDEUCE (1EA)

## (undated) DEVICE — SET EXTENSION WITH 2 PORTS (48EA/CA) ***PART #2C8610 IS A SUBSTITUTE*****

## (undated) DEVICE — PACK MINOR BASIN - (2EA/CA)

## (undated) DEVICE — SODIUM CHL IRRIGATION 0.9% 1000ML (12EA/CA)

## (undated) DEVICE — SYRINGE DISP. 60 CC LL - (30/BX, 12BX/CA)**WHEN THESE ARE GONE ORDER #500206**

## (undated) DEVICE — TUBING CLEARLINK DUO-VENT - C-FLO (48EA/CA)

## (undated) DEVICE — SENSOR SPO2 NEO LNCS ADHESIVE (20/BX) SEE USER NOTES

## (undated) DEVICE — ELECTRODE 850 FOAM ADHESIVE - HYDROGEL RADIOTRNSPRNT (50/PK)

## (undated) DEVICE — NEEDLE SAFETY 18 GA X 1 1/2 IN (100EA/BX)

## (undated) DEVICE — DRAPE LARGE 3 QUARTER - (20/CA)

## (undated) DEVICE — SUTURE GENERAL

## (undated) DEVICE — DRAPE SURGICAL U 77X120 - (10/CA)

## (undated) DEVICE — BOVIE BLADE COATED - (50/PK)

## (undated) DEVICE — Device

## (undated) DEVICE — DRAPE LAPAROTOMY T SHEET - (12EA/CA)

## (undated) DEVICE — GLOVE BIOGEL INDICATOR SZ 8 SURGICAL PF LTX - (50/BX 4BX/CA)

## (undated) DEVICE — CHLORAPREP 26 ML APPLICATOR - ORANGE TINT(25/CA)

## (undated) DEVICE — TOWEL STOP TIMEOUT SAFETY FLAG (40EA/CA)

## (undated) DEVICE — COVER LIGHT HANDLE ALC PLUS DISP (18EA/BX)

## (undated) DEVICE — HUMID-VENT HEAT AND MOISTURE EXCHANGE- (50/BX)

## (undated) DEVICE — DRAPE LOWER EXTREMETY - (6/CA)

## (undated) DEVICE — BAG SPONGE COUNT 10.25 X 32 - BLUE (250/CA)

## (undated) DEVICE — CANISTER SUCTION 3000ML MECHANICAL FILTER AUTO SHUTOFF MEDI-VAC NONSTERILE LF DISP  (40EA/CA)

## (undated) DEVICE — SODIUM CHL. IRRIGATION 0.9% 3000ML (4EA/CA 65CA/PF)